# Patient Record
Sex: MALE | Race: WHITE | Employment: OTHER | URBAN - METROPOLITAN AREA
[De-identification: names, ages, dates, MRNs, and addresses within clinical notes are randomized per-mention and may not be internally consistent; named-entity substitution may affect disease eponyms.]

---

## 2017-01-01 ENCOUNTER — OFFICE VISIT (OUTPATIENT)
Dept: ONCOLOGY | Age: 77
End: 2017-01-01

## 2017-01-01 ENCOUNTER — OFFICE VISIT (OUTPATIENT)
Dept: FAMILY MEDICINE CLINIC | Age: 77
End: 2017-01-01

## 2017-01-01 ENCOUNTER — TELEPHONE (OUTPATIENT)
Dept: FAMILY MEDICINE CLINIC | Age: 77
End: 2017-01-01

## 2017-01-01 ENCOUNTER — TELEPHONE (OUTPATIENT)
Dept: ONCOLOGY | Age: 77
End: 2017-01-01

## 2017-01-01 ENCOUNTER — DOCUMENTATION ONLY (OUTPATIENT)
Dept: ONCOLOGY | Age: 77
End: 2017-01-01

## 2017-01-01 ENCOUNTER — HOSPITAL ENCOUNTER (OUTPATIENT)
Dept: CT IMAGING | Age: 77
Discharge: HOME | End: 2017-11-03
Attending: INTERNAL MEDICINE
Payer: MEDICARE

## 2017-01-01 ENCOUNTER — HOSPITAL ENCOUNTER (OUTPATIENT)
Dept: NON INVASIVE DIAGNOSTICS | Age: 77
Discharge: HOME | End: 2017-11-03
Payer: MEDICARE

## 2017-01-01 ENCOUNTER — NURSE NAVIGATOR (OUTPATIENT)
Dept: ONCOLOGY | Age: 77
End: 2017-01-01

## 2017-01-01 ENCOUNTER — HOSPITAL ENCOUNTER (OUTPATIENT)
Dept: INFUSION THERAPY | Age: 77
Discharge: HOME | End: 2017-11-03
Payer: MEDICARE

## 2017-01-01 ENCOUNTER — HOSPITAL ENCOUNTER (OUTPATIENT)
Dept: CT IMAGING | Age: 77
Discharge: HOME | End: 2017-09-26
Attending: INTERNAL MEDICINE
Payer: MEDICARE

## 2017-01-01 ENCOUNTER — HOSPITAL ENCOUNTER (OUTPATIENT)
Dept: PET IMAGING | Age: 77
Discharge: HOME | End: 2017-09-15
Attending: INTERNAL MEDICINE
Payer: MEDICARE

## 2017-01-01 ENCOUNTER — APPOINTMENT (OUTPATIENT)
Dept: PET IMAGING | Age: 77
End: 2017-01-01
Attending: INTERNAL MEDICINE
Payer: MEDICARE

## 2017-01-01 ENCOUNTER — HOSPITAL ENCOUNTER (OUTPATIENT)
Dept: NON INVASIVE DIAGNOSTICS | Age: 77
Discharge: HOME | End: 2017-09-26
Attending: INTERNAL MEDICINE
Payer: MEDICARE

## 2017-01-01 ENCOUNTER — HOSPITAL ENCOUNTER (OUTPATIENT)
Dept: INFUSION THERAPY | Age: 77
Discharge: HOME | End: 2017-11-30
Payer: MEDICARE

## 2017-01-01 ENCOUNTER — HOSPITAL ENCOUNTER (OUTPATIENT)
Dept: MRI IMAGING | Age: 77
Discharge: HOME | End: 2017-09-15
Attending: INTERNAL MEDICINE
Payer: MEDICARE

## 2017-01-01 VITALS
WEIGHT: 122.9 LBS | SYSTOLIC BLOOD PRESSURE: 153 MMHG | HEIGHT: 64 IN | RESPIRATION RATE: 20 BRPM | DIASTOLIC BLOOD PRESSURE: 87 MMHG | HEART RATE: 85 BPM | OXYGEN SATURATION: 98 % | BODY MASS INDEX: 20.98 KG/M2 | TEMPERATURE: 97.1 F

## 2017-01-01 VITALS
SYSTOLIC BLOOD PRESSURE: 108 MMHG | BODY MASS INDEX: 21.15 KG/M2 | WEIGHT: 123.2 LBS | OXYGEN SATURATION: 93 % | RESPIRATION RATE: 20 BRPM | DIASTOLIC BLOOD PRESSURE: 64 MMHG | HEART RATE: 92 BPM

## 2017-01-01 VITALS
OXYGEN SATURATION: 99 % | DIASTOLIC BLOOD PRESSURE: 76 MMHG | WEIGHT: 126 LBS | RESPIRATION RATE: 16 BRPM | SYSTOLIC BLOOD PRESSURE: 150 MMHG | HEART RATE: 80 BPM | BODY MASS INDEX: 21.63 KG/M2

## 2017-01-01 VITALS
BODY MASS INDEX: 20.79 KG/M2 | RESPIRATION RATE: 17 BRPM | TEMPERATURE: 96.2 F | SYSTOLIC BLOOD PRESSURE: 122 MMHG | HEART RATE: 80 BPM | DIASTOLIC BLOOD PRESSURE: 71 MMHG | OXYGEN SATURATION: 96 % | HEIGHT: 64 IN | WEIGHT: 121.8 LBS

## 2017-01-01 VITALS
HEART RATE: 85 BPM | OXYGEN SATURATION: 94 % | BODY MASS INDEX: 20.93 KG/M2 | DIASTOLIC BLOOD PRESSURE: 73 MMHG | RESPIRATION RATE: 17 BRPM | HEIGHT: 64 IN | WEIGHT: 122.6 LBS | SYSTOLIC BLOOD PRESSURE: 130 MMHG | TEMPERATURE: 98 F

## 2017-01-01 VITALS
SYSTOLIC BLOOD PRESSURE: 130 MMHG | TEMPERATURE: 96.4 F | OXYGEN SATURATION: 97 % | RESPIRATION RATE: 16 BRPM | BODY MASS INDEX: 20.94 KG/M2 | DIASTOLIC BLOOD PRESSURE: 80 MMHG | HEART RATE: 88 BPM | WEIGHT: 122 LBS

## 2017-01-01 VITALS
HEART RATE: 86 BPM | BODY MASS INDEX: 20.73 KG/M2 | TEMPERATURE: 99 F | DIASTOLIC BLOOD PRESSURE: 81 MMHG | HEIGHT: 64 IN | SYSTOLIC BLOOD PRESSURE: 123 MMHG | RESPIRATION RATE: 20 BRPM | WEIGHT: 121.4 LBS | OXYGEN SATURATION: 97 %

## 2017-01-01 VITALS
TEMPERATURE: 98.1 F | SYSTOLIC BLOOD PRESSURE: 153 MMHG | DIASTOLIC BLOOD PRESSURE: 93 MMHG | HEART RATE: 88 BPM | RESPIRATION RATE: 16 BRPM

## 2017-01-01 VITALS
WEIGHT: 121.6 LBS | TEMPERATURE: 98.7 F | SYSTOLIC BLOOD PRESSURE: 133 MMHG | HEIGHT: 64 IN | RESPIRATION RATE: 20 BRPM | OXYGEN SATURATION: 96 % | BODY MASS INDEX: 20.76 KG/M2 | DIASTOLIC BLOOD PRESSURE: 85 MMHG | HEART RATE: 86 BPM

## 2017-01-01 VITALS
SYSTOLIC BLOOD PRESSURE: 130 MMHG | RESPIRATION RATE: 14 BRPM | BODY MASS INDEX: 19.97 KG/M2 | HEIGHT: 64 IN | HEART RATE: 74 BPM | WEIGHT: 117 LBS | DIASTOLIC BLOOD PRESSURE: 80 MMHG

## 2017-01-01 VITALS
HEIGHT: 64 IN | SYSTOLIC BLOOD PRESSURE: 164 MMHG | DIASTOLIC BLOOD PRESSURE: 100 MMHG | TEMPERATURE: 97.5 F | HEART RATE: 90 BPM | OXYGEN SATURATION: 95 % | BODY MASS INDEX: 21.17 KG/M2 | WEIGHT: 124 LBS

## 2017-01-01 VITALS
SYSTOLIC BLOOD PRESSURE: 135 MMHG | TEMPERATURE: 97.5 F | RESPIRATION RATE: 16 BRPM | HEART RATE: 89 BPM | DIASTOLIC BLOOD PRESSURE: 87 MMHG

## 2017-01-01 VITALS — BODY MASS INDEX: 21.34 KG/M2 | WEIGHT: 125 LBS | HEIGHT: 64 IN

## 2017-01-01 DIAGNOSIS — R07.81 PLEURITIC CHEST PAIN: ICD-10-CM

## 2017-01-01 DIAGNOSIS — C64.9 PRIMARY MALIGNANT NEOPLASM OF KIDNEY WITH METASTASIS FROM KIDNEY TO OTHER SITE, UNSPECIFIED LATERALITY (HCC): Primary | ICD-10-CM

## 2017-01-01 DIAGNOSIS — Z86.711 HISTORY OF PULMONARY EMBOLUS (PE): ICD-10-CM

## 2017-01-01 DIAGNOSIS — Z79.01 ANTICOAGULANT LONG-TERM USE: ICD-10-CM

## 2017-01-01 DIAGNOSIS — I26.99 BILATERAL PULMONARY EMBOLISM (HCC): Primary | ICD-10-CM

## 2017-01-01 DIAGNOSIS — C79.9: ICD-10-CM

## 2017-01-01 DIAGNOSIS — C64.1 RENAL CARCINOMA, RIGHT (HCC): ICD-10-CM

## 2017-01-01 DIAGNOSIS — Z23 ENCOUNTER FOR IMMUNIZATION: ICD-10-CM

## 2017-01-01 DIAGNOSIS — Z79.01 ANTICOAGULANT LONG-TERM USE: Primary | ICD-10-CM

## 2017-01-01 DIAGNOSIS — R06.02 SOB (SHORTNESS OF BREATH) ON EXERTION: ICD-10-CM

## 2017-01-01 DIAGNOSIS — R06.09 DOE (DYSPNEA ON EXERTION): ICD-10-CM

## 2017-01-01 DIAGNOSIS — C64.9 RENAL CARCINOMA, UNSPECIFIED LATERALITY (HCC): ICD-10-CM

## 2017-01-01 DIAGNOSIS — I26.99 BILATERAL PULMONARY EMBOLISM (HCC): ICD-10-CM

## 2017-01-01 DIAGNOSIS — C64.9 KIDNEY CANCER, PRIMARY, WITH METASTASIS FROM KIDNEY TO OTHER SITE, UNSPECIFIED LATERALITY (HCC): Primary | ICD-10-CM

## 2017-01-01 DIAGNOSIS — I10 ESSENTIAL HYPERTENSION: ICD-10-CM

## 2017-01-01 DIAGNOSIS — C64.1 PRIMARY CLEAR CELL CARCINOMA OF RIGHT KIDNEY (HCC): ICD-10-CM

## 2017-01-01 DIAGNOSIS — C64.9 KIDNEY CANCER, PRIMARY, WITH METASTASIS FROM KIDNEY TO OTHER SITE, UNSPECIFIED LATERALITY (HCC): ICD-10-CM

## 2017-01-01 DIAGNOSIS — C78.00 MALIGNANT NEOPLASM METASTATIC TO LUNG, UNSPECIFIED LATERALITY (HCC): ICD-10-CM

## 2017-01-01 DIAGNOSIS — D41.01 NEOPLASM OF UNCERTAIN BEHAVIOR OF RIGHT KIDNEY: ICD-10-CM

## 2017-01-01 DIAGNOSIS — C64.1 PRIMARY CLEAR CELL CARCINOMA OF RIGHT KIDNEY (HCC): Primary | ICD-10-CM

## 2017-01-01 DIAGNOSIS — C64.9 RENAL CARCINOMA, UNSPECIFIED LATERALITY (HCC): Primary | ICD-10-CM

## 2017-01-01 DIAGNOSIS — Z00.00 MEDICARE ANNUAL WELLNESS VISIT, SUBSEQUENT: Primary | ICD-10-CM

## 2017-01-01 DIAGNOSIS — R79.89 ABNORMAL LFTS: Primary | ICD-10-CM

## 2017-01-01 DIAGNOSIS — C64.9 CARCINOMA OF KIDNEY, UNSPECIFIED LATERALITY (HCC): ICD-10-CM

## 2017-01-01 LAB
ALBUMIN SERPL-MCNC: 2.6 G/DL (ref 3.5–5)
ALBUMIN SERPL-MCNC: 2.8 G/DL (ref 3.5–5)
ALBUMIN SERPL-MCNC: 3.8 G/DL (ref 3.5–4.8)
ALBUMIN/GLOB SERPL: 0.5 {RATIO} (ref 1.1–2.2)
ALBUMIN/GLOB SERPL: 0.6 {RATIO} (ref 1.1–2.2)
ALBUMIN/GLOB SERPL: 1.2 {RATIO} (ref 1.2–2.2)
ALP SERPL-CCNC: 105 IU/L (ref 39–117)
ALP SERPL-CCNC: 151 U/L (ref 45–117)
ALP SERPL-CCNC: 170 U/L (ref 45–117)
ALT SERPL-CCNC: 121 U/L (ref 12–78)
ALT SERPL-CCNC: 18 IU/L (ref 0–44)
ALT SERPL-CCNC: 35 U/L (ref 12–78)
ANION GAP SERPL CALC-SCNC: 7 MMOL/L (ref 5–15)
ANION GAP SERPL CALC-SCNC: 9 MMOL/L (ref 5–15)
AST SERPL-CCNC: 16 IU/L (ref 0–40)
AST SERPL-CCNC: 39 U/L (ref 15–37)
AST SERPL-CCNC: 89 U/L (ref 15–37)
ATRIAL RATE: 74 BPM
BASOPHILS # BLD AUTO: 0 X10E3/UL (ref 0–0.2)
BASOPHILS # BLD: 0 K/UL (ref 0–0.1)
BASOPHILS # BLD: 0 K/UL (ref 0–0.1)
BASOPHILS NFR BLD AUTO: 0 %
BASOPHILS NFR BLD: 0 % (ref 0–1)
BASOPHILS NFR BLD: 0 % (ref 0–1)
BILIRUB SERPL-MCNC: 0.2 MG/DL (ref 0–1.2)
BILIRUB SERPL-MCNC: 0.5 MG/DL (ref 0.2–1)
BILIRUB SERPL-MCNC: 0.5 MG/DL (ref 0.2–1)
BUN SERPL-MCNC: 22 MG/DL (ref 6–20)
BUN SERPL-MCNC: 27 MG/DL (ref 6–20)
BUN SERPL-MCNC: 30 MG/DL (ref 8–27)
BUN/CREAT SERPL: 16 (ref 12–20)
BUN/CREAT SERPL: 18 (ref 12–20)
BUN/CREAT SERPL: 22 (ref 10–24)
CALCIUM SERPL-MCNC: 8.9 MG/DL (ref 8.5–10.1)
CALCIUM SERPL-MCNC: 9.1 MG/DL (ref 8.6–10.2)
CALCIUM SERPL-MCNC: 9.5 MG/DL (ref 8.5–10.1)
CALCULATED P AXIS, ECG09: 79 DEGREES
CALCULATED R AXIS, ECG10: 105 DEGREES
CALCULATED T AXIS, ECG11: 58 DEGREES
CHLORIDE SERPL-SCNC: 100 MMOL/L (ref 97–108)
CHLORIDE SERPL-SCNC: 102 MMOL/L (ref 96–106)
CHLORIDE SERPL-SCNC: 102 MMOL/L (ref 97–108)
CO2 SERPL-SCNC: 24 MMOL/L (ref 18–29)
CO2 SERPL-SCNC: 24 MMOL/L (ref 21–32)
CO2 SERPL-SCNC: 28 MMOL/L (ref 21–32)
CREAT SERPL-MCNC: 1.34 MG/DL (ref 0.7–1.3)
CREAT SERPL-MCNC: 1.37 MG/DL (ref 0.76–1.27)
CREAT SERPL-MCNC: 1.46 MG/DL (ref 0.7–1.3)
CREAT UR-MCNC: 140 MG/DL
DIAGNOSIS, 93000: NORMAL
EOSINOPHIL # BLD AUTO: 0.2 X10E3/UL (ref 0–0.4)
EOSINOPHIL # BLD: 0.2 K/UL (ref 0–0.4)
EOSINOPHIL # BLD: 0.3 K/UL (ref 0–0.4)
EOSINOPHIL NFR BLD AUTO: 2 %
EOSINOPHIL NFR BLD: 2 % (ref 0–7)
EOSINOPHIL NFR BLD: 3 % (ref 0–7)
ERYTHROCYTE [DISTWIDTH] IN BLOOD BY AUTOMATED COUNT: 14.3 % (ref 12.3–15.4)
ERYTHROCYTE [DISTWIDTH] IN BLOOD BY AUTOMATED COUNT: 15.2 % (ref 11.5–14.5)
ERYTHROCYTE [DISTWIDTH] IN BLOOD BY AUTOMATED COUNT: 19 % (ref 11.5–14.5)
GLOBULIN SER CALC-MCNC: 3.1 G/DL (ref 1.5–4.5)
GLOBULIN SER CALC-MCNC: 5 G/DL (ref 2–4)
GLOBULIN SER CALC-MCNC: 5.1 G/DL (ref 2–4)
GLUCOSE SERPL-MCNC: 111 MG/DL (ref 65–100)
GLUCOSE SERPL-MCNC: 113 MG/DL (ref 65–100)
GLUCOSE SERPL-MCNC: 79 MG/DL (ref 65–99)
HCT VFR BLD AUTO: 35.2 % (ref 37.5–51)
HCT VFR BLD AUTO: 39.9 % (ref 36.6–50.3)
HCT VFR BLD AUTO: 43.2 % (ref 36.6–50.3)
HGB BLD-MCNC: 11.5 G/DL (ref 12.6–17.7)
HGB BLD-MCNC: 13.1 G/DL (ref 12.1–17)
HGB BLD-MCNC: 13.9 G/DL (ref 12.1–17)
IMM GRANULOCYTES # BLD: 0 X10E3/UL (ref 0–0.1)
IMM GRANULOCYTES NFR BLD: 0 %
LYMPHOCYTES # BLD AUTO: 1.8 X10E3/UL (ref 0.7–3.1)
LYMPHOCYTES # BLD: 1.8 K/UL (ref 0.8–3.5)
LYMPHOCYTES # BLD: 1.8 K/UL (ref 0.8–3.5)
LYMPHOCYTES NFR BLD AUTO: 18 %
LYMPHOCYTES NFR BLD: 17 % (ref 12–49)
LYMPHOCYTES NFR BLD: 19 % (ref 12–49)
MAGNESIUM SERPL-MCNC: 2.3 MG/DL (ref 1.6–2.4)
MCH RBC QN AUTO: 27.6 PG (ref 26–34)
MCH RBC QN AUTO: 28.4 PG (ref 26.6–33)
MCH RBC QN AUTO: 28.9 PG (ref 26–34)
MCHC RBC AUTO-ENTMCNC: 32.2 G/DL (ref 30–36.5)
MCHC RBC AUTO-ENTMCNC: 32.7 G/DL (ref 31.5–35.7)
MCHC RBC AUTO-ENTMCNC: 32.8 G/DL (ref 30–36.5)
MCV RBC AUTO: 85.7 FL (ref 80–99)
MCV RBC AUTO: 87 FL (ref 79–97)
MCV RBC AUTO: 87.9 FL (ref 80–99)
MONOCYTES # BLD AUTO: 0.9 X10E3/UL (ref 0.1–0.9)
MONOCYTES # BLD: 0.8 K/UL (ref 0–1)
MONOCYTES # BLD: 1.1 K/UL (ref 0–1)
MONOCYTES NFR BLD AUTO: 9 %
MONOCYTES NFR BLD: 10 % (ref 5–13)
MONOCYTES NFR BLD: 9 % (ref 5–13)
NEUTROPHILS # BLD AUTO: 6.9 X10E3/UL (ref 1.4–7)
NEUTROPHILS NFR BLD AUTO: 71 %
NEUTS SEG # BLD: 6.4 K/UL (ref 1.8–8)
NEUTS SEG # BLD: 7.5 K/UL (ref 1.8–8)
NEUTS SEG NFR BLD: 69 % (ref 32–75)
NEUTS SEG NFR BLD: 71 % (ref 32–75)
P-R INTERVAL, ECG05: 140 MS
PLATELET # BLD AUTO: 207 K/UL (ref 150–400)
PLATELET # BLD AUTO: 208 K/UL (ref 150–400)
PLATELET # BLD AUTO: 395 X10E3/UL (ref 150–379)
POTASSIUM SERPL-SCNC: 4.2 MMOL/L (ref 3.5–5.1)
POTASSIUM SERPL-SCNC: 4.8 MMOL/L (ref 3.5–5.1)
POTASSIUM SERPL-SCNC: 5 MMOL/L (ref 3.5–5.2)
PROT SERPL-MCNC: 6.9 G/DL (ref 6–8.5)
PROT SERPL-MCNC: 7.7 G/DL (ref 6.4–8.2)
PROT SERPL-MCNC: 7.8 G/DL (ref 6.4–8.2)
PROT UR-MCNC: 142 MG/DL (ref 0–11.9)
Q-T INTERVAL, ECG07: 402 MS
QRS DURATION, ECG06: 88 MS
QTC CALCULATION (BEZET), ECG08: 446 MS
RBC # BLD AUTO: 4.05 X10E6/UL (ref 4.14–5.8)
RBC # BLD AUTO: 4.54 M/UL (ref 4.1–5.7)
RBC # BLD AUTO: 5.04 M/UL (ref 4.1–5.7)
SODIUM SERPL-SCNC: 135 MMOL/L (ref 136–145)
SODIUM SERPL-SCNC: 135 MMOL/L (ref 136–145)
SODIUM SERPL-SCNC: 142 MMOL/L (ref 134–144)
TSH SERPL DL<=0.05 MIU/L-ACNC: 4.44 UIU/ML (ref 0.36–3.74)
VENTRICULAR RATE, ECG03: 74 BPM
WBC # BLD AUTO: 10.6 K/UL (ref 4.1–11.1)
WBC # BLD AUTO: 9.3 K/UL (ref 4.1–11.1)
WBC # BLD AUTO: 9.9 X10E3/UL (ref 3.4–10.8)

## 2017-01-01 PROCEDURE — 84156 ASSAY OF PROTEIN URINE: CPT | Performed by: NURSE PRACTITIONER

## 2017-01-01 PROCEDURE — 85025 COMPLETE CBC W/AUTO DIFF WBC: CPT | Performed by: NURSE PRACTITIONER

## 2017-01-01 PROCEDURE — 82570 ASSAY OF URINE CREATININE: CPT | Performed by: NURSE PRACTITIONER

## 2017-01-01 PROCEDURE — 80053 COMPREHEN METABOLIC PANEL: CPT | Performed by: NURSE PRACTITIONER

## 2017-01-01 PROCEDURE — 36415 COLL VENOUS BLD VENIPUNCTURE: CPT | Performed by: NURSE PRACTITIONER

## 2017-01-01 PROCEDURE — 93306 TTE W/DOPPLER COMPLETE: CPT

## 2017-01-01 PROCEDURE — 74011636320 HC RX REV CODE- 636/320

## 2017-01-01 PROCEDURE — 84443 ASSAY THYROID STIM HORMONE: CPT | Performed by: NURSE PRACTITIONER

## 2017-01-01 PROCEDURE — 74177 CT ABD & PELVIS W/CONTRAST: CPT

## 2017-01-01 PROCEDURE — A9552 F18 FDG: HCPCS

## 2017-01-01 PROCEDURE — 70551 MRI BRAIN STEM W/O DYE: CPT

## 2017-01-01 PROCEDURE — 74011000255 HC RX REV CODE- 255: Performed by: INTERNAL MEDICINE

## 2017-01-01 PROCEDURE — 74011636320 HC RX REV CODE- 636/320: Performed by: INTERNAL MEDICINE

## 2017-01-01 PROCEDURE — 83735 ASSAY OF MAGNESIUM: CPT | Performed by: NURSE PRACTITIONER

## 2017-01-01 PROCEDURE — 74011250636 HC RX REV CODE- 250/636: Performed by: INTERNAL MEDICINE

## 2017-01-01 PROCEDURE — 71275 CT ANGIOGRAPHY CHEST: CPT

## 2017-01-01 RX ORDER — HYDROCODONE BITARTRATE AND ACETAMINOPHEN 7.5; 3 MG/1; MG/1
1 TABLET ORAL
Qty: 60 TAB | Refills: 0 | Status: SHIPPED | OUTPATIENT
Start: 2017-01-01 | End: 2017-01-01 | Stop reason: ALTCHOICE

## 2017-01-01 RX ORDER — SODIUM CHLORIDE 0.9 % (FLUSH) 0.9 %
10 SYRINGE (ML) INJECTION
Status: COMPLETED | OUTPATIENT
Start: 2017-01-01 | End: 2017-01-01

## 2017-01-01 RX ORDER — SODIUM CHLORIDE 0.9 % (FLUSH) 0.9 %
10 SYRINGE (ML) INJECTION
Status: DISPENSED | OUTPATIENT
Start: 2017-01-01 | End: 2017-01-01

## 2017-01-01 RX ORDER — HYDROCODONE BITARTRATE AND ACETAMINOPHEN 5; 325 MG/1; MG/1
1 TABLET ORAL
Qty: 60 TAB | Refills: 0 | Status: SHIPPED | OUTPATIENT
Start: 2017-01-01 | End: 2017-01-01 | Stop reason: ALTCHOICE

## 2017-01-01 RX ORDER — SUNITINIB MALATE 50 MG/1
CAPSULE ORAL
COMMUNITY
Start: 2017-01-01 | End: 2017-01-01 | Stop reason: ALTCHOICE

## 2017-01-01 RX ORDER — APIXABAN 5 MG/1
TABLET, FILM COATED ORAL
COMMUNITY
Start: 2017-01-01 | End: 2017-01-01 | Stop reason: ALTCHOICE

## 2017-01-01 RX ORDER — AMLODIPINE BESYLATE 5 MG/1
10 TABLET ORAL DAILY
Qty: 60 TAB | Refills: 5 | Status: ON HOLD | OUTPATIENT
Start: 2017-01-01 | End: 2018-01-01 | Stop reason: SDUPTHER

## 2017-01-01 RX ORDER — AMLODIPINE BESYLATE 5 MG/1
5 TABLET ORAL DAILY
Qty: 90 TAB | Refills: 4 | Status: SHIPPED | OUTPATIENT
Start: 2017-01-01 | End: 2017-01-01 | Stop reason: SDUPTHER

## 2017-01-01 RX ORDER — BARIUM SULFATE 20 MG/ML
900 SUSPENSION ORAL
Status: COMPLETED | OUTPATIENT
Start: 2017-01-01 | End: 2017-01-01

## 2017-01-01 RX ORDER — SODIUM CHLORIDE 9 MG/ML
50 INJECTION, SOLUTION INTRAVENOUS
Status: COMPLETED | OUTPATIENT
Start: 2017-01-01 | End: 2017-01-01

## 2017-01-01 RX ORDER — ENOXAPARIN SODIUM 100 MG/ML
80 INJECTION SUBCUTANEOUS DAILY
Qty: 30 SYRINGE | Refills: 11 | Status: SHIPPED | OUTPATIENT
Start: 2017-01-01 | End: 2017-01-01 | Stop reason: ALTCHOICE

## 2017-01-01 RX ADMIN — Medication 10 ML: at 12:47

## 2017-01-01 RX ADMIN — Medication 10 ML: at 08:11

## 2017-01-01 RX ADMIN — IOPAMIDOL 100 ML: 755 INJECTION, SOLUTION INTRAVENOUS at 12:47

## 2017-01-01 RX ADMIN — SODIUM CHLORIDE 50 ML/HR: 900 INJECTION, SOLUTION INTRAVENOUS at 12:47

## 2017-01-01 RX ADMIN — BARIUM SULFATE 900 ML: 21 SUSPENSION ORAL at 12:47

## 2017-03-22 ENCOUNTER — OFFICE VISIT (OUTPATIENT)
Dept: FAMILY MEDICINE CLINIC | Age: 77
End: 2017-03-22

## 2017-03-22 VITALS
BODY MASS INDEX: 22.2 KG/M2 | HEART RATE: 78 BPM | RESPIRATION RATE: 19 BRPM | WEIGHT: 130 LBS | SYSTOLIC BLOOD PRESSURE: 128 MMHG | DIASTOLIC BLOOD PRESSURE: 76 MMHG | OXYGEN SATURATION: 98 % | HEIGHT: 64 IN

## 2017-03-22 DIAGNOSIS — R06.02 SOB (SHORTNESS OF BREATH): Primary | ICD-10-CM

## 2017-03-22 DIAGNOSIS — C64.1 RENAL CELL CANCER, RIGHT (HCC): ICD-10-CM

## 2017-03-22 DIAGNOSIS — I27.82 OTHER CHRONIC PULMONARY EMBOLISM WITHOUT ACUTE COR PULMONALE (HCC): ICD-10-CM

## 2017-03-22 RX ORDER — ASPIRIN 81 MG/1
TABLET ORAL DAILY
COMMUNITY
End: 2017-01-01

## 2017-03-22 RX ORDER — IPRATROPIUM BROMIDE 21 UG/1
2 SPRAY, METERED NASAL 2 TIMES DAILY
Qty: 30 ML | Refills: 1 | Status: SHIPPED | OUTPATIENT
Start: 2017-03-22 | End: 2017-04-20

## 2017-03-22 NOTE — PROGRESS NOTES
Chief Complaint   Patient presents with    Shortness of Breath     coughing in the mornings. HPI:      Chris Logan is a 68 y.o. male. 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 He experienced post op Pulmonary Embolism and remains on Warfarin until Sept 2016. He is otherwise very healthy and active and enjoys fishing. He has hypercholesterolemia but does not require medication at this time. New Issues:  Has 2 complaints:  Early morning nasal drainage with cough that gets better as the day progresses. Notes BEJARANO and SOB, particularly when ascending stairs. No hemoptysis. No sputum. Brother recently noted to have facial melanoma. No Known Allergies    Current Outpatient Prescriptions   Medication Sig    ANTIOX#10/OM3/DHA/EPA/LUT/ZEAX (I-CAPS PO) Take  by mouth.  aspirin delayed-release 81 mg tablet Take  by mouth daily.  ipratropium (ATROVENT) 0.03 % nasal spray 2 Sprays by Both Nostrils route two (2) times a day. No current facility-administered medications for this visit. Past Medical History:   Diagnosis Date    Actinic keratosis     Calculus of kidney     Chronic pain     Affecting lower back,Grade 3L5/S1 spondylisthesis    Hypercholesterolemia     Palpitations     PE (pulmonary embolism) 09/01/2014    Post right nephrectomy    Renal carcinoma ([facility] [address]     Dx'd inSept.2014, S/P surgical resection         ROS:  Denies fever, chills, cough, chest pain, SOB,  nausea, vomiting, or diarrhea. Denies wt loss, wt gain, hemoptysis, hematochezia or melena.     Physical Examination:    /76 (BP 1 Location: Right arm, BP Patient Position: Sitting)  Pulse 78  Resp 19  Ht 5' 3.5\" (1.613 m)  Wt 130 lb (59 kg)  SpO2 98%  BMI 22.67 kg/m2    General: Alert and Ox3, Fluent speech  HEENT:  NC/AT, EOMI, OP: clear  Neck:  Supple, no adenopathy, JVD, mass or bruit  Chest:  Clear to Ausculation, without wheezes, rales, rubs or ronchi  Cardiac: RRR  Abdomen:  +BS, soft, nontender without palpable HSM  Extremities:  No cyanosis, clubbing or edema  Neurologic:  Ambulatory without assist, CN 2-12 grossly intact. Moves all extremities. Skin: no rash  Lymphadenopathy: no cervical or supraclavicular nodes      ASSESSMENT AND PLAN:     1. BEJARANO and SOB:  Etiology is unclear. Checking CXR, D DImer, BNP, CBC and CMP. Certainly has an element of cough due to PND. If D dimer is +, I would favor further imaging and reinstitution of anticoagulation  2. PND:  Trial of Atrovent. 3.  WIll phone with results. Orders Placed This Encounter    XR CHEST PA LAT     Standing Status:   Future     Standing Expiration Date:   2018     Order Specific Question:   Reason for Exam     Answer:   BEJARANO, SOB     Order Specific Question:   Is Patient Allergic to Contrast Dye? Answer:   Unknown    D DIMER    CBC WITH AUTOMATED DIFF    METABOLIC PANEL, COMPREHENSIVE    BNP    IL COLLECTION VENOUS BLOOD,VENIPUNCTURE    ANTIOX#10/OM3/DHA/EPA/LUT/ZEAX (I-CAPS PO)     Sig: Take  by mouth.  aspirin delayed-release 81 mg tablet     Sig: Take  by mouth daily.  ipratropium (ATROVENT) 0.03 % nasal spray     Si Sprays by Both Nostrils route two (2) times a day.      Dispense:  30 mL     Refill:  1       Harley Wood MD, Bam Castro

## 2017-03-22 NOTE — MR AVS SNAPSHOT
Visit Information Date & Time Provider Department Dept. Phone Encounter #  
 3/22/2017  2:00 PM Nixon Ray, Cindy Harvey [id number] Follow-up Instructions Return in about 4 weeks (around 4/19/2017). Follow-up and Disposition History Upcoming Health Maintenance Date Due  
 GLAUCOMA SCREENING Q2Y 2/7/2005 Pneumococcal 65+ High/Highest Risk (1 of 2 - PCV13) 2/7/2005 MEDICARE YEARLY EXAM 2/7/2005 DTaP/Tdap/Td series (2 - Td) 10/7/2025 Allergies as of 3/22/2017  Review Complete On: 3/22/2017 By: Nixon Ray MD  
 No Known Allergies Current Immunizations  Never Reviewed Name Date Influenza Vaccine 11/4/2016, 10/7/2015 12:17 PM  
 Tdap 10/7/2015 12:19 PM  
 Zoster Vaccine, Live 4/4/2011 Not reviewed this visit You Were Diagnosed With   
  
 Codes Comments SOB (shortness of breath)    -  Primary ICD-10-CM: R06.02 
ICD-9-CM: 786.05 Renal cell cancer, right (HCC)     ICD-10-CM: C64.1 ICD-9-CM: 189.0 Other chronic pulmonary embolism without acute cor pulmonale (HCC)     ICD-10-CM: G97.21 Vitals BP Pulse Resp Height(growth percentile) Weight(growth percentile) SpO2  
 128/76 (BP 1 Location: Right arm, BP Patient Position: Sitting) 78 19 5' 3.5\" (1.613 m) 130 lb (59 kg) 98% BMI Smoking Status 22.67 kg/m2 Light Tobacco Smoker Vitals History BMI and BSA Data Body Mass Index Body Surface Area  
 22.67 kg/m 2 1.63 m 2 Preferred Pharmacy Pharmacy Name Phone [address] [place] [phone] Your Updated Medication List  
  
   
This list is accurate as of: 3/22/17  3:09 PM.  Always use your most recent med list.  
  
  
  
  
 aspirin delayed-release 81 mg tablet Take  by mouth daily. I-CAPS PO Take  by mouth. ipratropium 0.03 % nasal spray Commonly known as:  ATROVENT  
 2 Sprays by Both Nostrils route two (2) times a day. Prescriptions Printed Refills  
 ipratropium (ATROVENT) 0.03 % nasal spray 1 Si Sprays by Both Nostrils route two (2) times a day. Class: Print Route: Both Nostrils We Performed the Following BNP [id number] CPT(R)] CBC WITH AUTOMATED DIFF [07088 CPT(R)] D DIMER [id number] CPT(R)] METABOLIC PANEL, COMPREHENSIVE [50024 CPT(R)] HI COLLECTION VENOUS BLOOD,VENIPUNCTURE [id number] CPT(R)] Follow-up Instructions Return in about 4 weeks (around 2017). To-Do List   
 Around 2017 Imaging:  XR CHEST PA LAT Introducing [facility] SERVICES! Dear Sruthi Eduardo: Thank you for requesting a [email or web address] account. Our records indicate that you already have an active [email or web address] account. You can access your account anytime at [email or web address] Did you know that you can access your hospital and ER discharge instructions at any time in [email or web address]? You can also review all of your test results from your hospital stay or ER visit. Additional Information If you have questions, please visit the Frequently Asked Questions section of the [email or web address] website at [email or web address] Remember, [email or web address] is NOT to be used for urgent needs. For medical emergencies, dial 911. Now available from your iPhone and Android! Please provide this summary of care documentation to your next provider. Your primary care clinician is listed as Kaity Jacobs. If you have any questions after today's visit, please call [phone].

## 2017-03-23 LAB
ALBUMIN SERPL-MCNC: 4 G/DL (ref 3.5–4.8)
ALBUMIN/GLOB SERPL: 1.3 {RATIO} (ref 1.2–2.2)
ALP SERPL-CCNC: 85 IU/L (ref 39–117)
ALT SERPL-CCNC: 16 IU/L (ref 0–44)
AST SERPL-CCNC: 19 IU/L (ref 0–40)
BASOPHILS # BLD AUTO: 0 X10E3/UL (ref 0–0.2)
BASOPHILS NFR BLD AUTO: 0 %
BILIRUB SERPL-MCNC: <0.2 MG/DL (ref 0–1.2)
BNP SERPL-MCNC: 16.6 PG/ML (ref 0–100)
BUN SERPL-MCNC: 35 MG/DL (ref 8–27)
BUN/CREAT SERPL: 23 (ref 10–22)
CALCIUM SERPL-MCNC: 9.5 MG/DL (ref 8.6–10.2)
CHLORIDE SERPL-SCNC: 99 MMOL/L (ref 96–106)
CO2 SERPL-SCNC: 25 MMOL/L (ref 18–29)
CREAT SERPL-MCNC: 1.51 MG/DL (ref 0.76–1.27)
D DIMER PPP FEU-MCNC: 0.47 MG/L FEU (ref 0–0.49)
EOSINOPHIL # BLD AUTO: 0.4 X10E3/UL (ref 0–0.4)
EOSINOPHIL NFR BLD AUTO: 3 %
ERYTHROCYTE [DISTWIDTH] IN BLOOD BY AUTOMATED COUNT: 14.2 % (ref 12.3–15.4)
GLOBULIN SER CALC-MCNC: 3 G/DL (ref 1.5–4.5)
GLUCOSE SERPL-MCNC: 94 MG/DL (ref 65–99)
HCT VFR BLD AUTO: 36.8 % (ref 37.5–51)
HGB BLD-MCNC: 12.4 G/DL (ref 12.6–17.7)
IMM GRANULOCYTES # BLD: 0 X10E3/UL (ref 0–0.1)
IMM GRANULOCYTES NFR BLD: 0 %
LYMPHOCYTES # BLD AUTO: 2.7 X10E3/UL (ref 0.7–3.1)
LYMPHOCYTES NFR BLD AUTO: 26 %
MCH RBC QN AUTO: 29.2 PG (ref 26.6–33)
MCHC RBC AUTO-ENTMCNC: 33.7 G/DL (ref 31.5–35.7)
MCV RBC AUTO: 87 FL (ref 79–97)
MONOCYTES # BLD AUTO: 0.8 X10E3/UL (ref 0.1–0.9)
MONOCYTES NFR BLD AUTO: 7 %
NEUTROPHILS # BLD AUTO: 6.7 X10E3/UL (ref 1.4–7)
NEUTROPHILS NFR BLD AUTO: 64 %
PLATELET # BLD AUTO: 343 X10E3/UL (ref 150–379)
POTASSIUM SERPL-SCNC: 5.1 MMOL/L (ref 3.5–5.2)
PROT SERPL-MCNC: 7 G/DL (ref 6–8.5)
RBC # BLD AUTO: 4.25 X10E6/UL (ref 4.14–5.8)
SODIUM SERPL-SCNC: 141 MMOL/L (ref 134–144)
WBC # BLD AUTO: 10.7 X10E3/UL (ref 3.4–10.8)

## 2017-04-20 ENCOUNTER — OFFICE VISIT (OUTPATIENT)
Dept: FAMILY MEDICINE CLINIC | Age: 77
End: 2017-04-20

## 2017-04-20 VITALS
WEIGHT: 128.2 LBS | HEIGHT: 64 IN | SYSTOLIC BLOOD PRESSURE: 126 MMHG | OXYGEN SATURATION: 98 % | TEMPERATURE: 97.8 F | RESPIRATION RATE: 16 BRPM | HEART RATE: 71 BPM | BODY MASS INDEX: 21.89 KG/M2 | DIASTOLIC BLOOD PRESSURE: 76 MMHG

## 2017-04-20 DIAGNOSIS — C44.311 BASAL CELL CARCINOMA OF NOSE: ICD-10-CM

## 2017-04-20 DIAGNOSIS — L57.0 ACTINIC KERATOSIS: ICD-10-CM

## 2017-04-20 DIAGNOSIS — R06.09 DOE (DYSPNEA ON EXERTION): Primary | ICD-10-CM

## 2017-04-20 NOTE — PROGRESS NOTES
Chief Complaint   Patient presents with    Shortness of Breath     With Exercise For Several Months. 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 He experienced post op Pulmonary Embolism and remained  on Warfarin until Sept 2016. He is otherwise very healthy and active and enjoys fishing. He has hypercholesterolemia but does not require medication at this time. Mr Dileep Clarke presented to the clinic at [facility] 3/22/14 with BEJARANO and SOB particularly when ascending stairs. This had been present for several weeks. Due to concerns of recurrent PE and CHF as potential etiologies, he underwent a limited workup at that time which resulted in a BNP 16, D-Dimer 0.47, Hgb 12.4 and a normal CXR. A conditioning program was prescribed with interval reassessment in 3-4 weeks. New Issues:  Mr Dileep Clarke remains SOB and primarily complains of BEJARANO. No pleuritic pain. Also notes that he has several skin problems:  Nose and left arm. No Known Allergies    Current Outpatient Prescriptions   Medication Sig    ANTIOX#10/OM3/DHA/EPA/LUT/ZEAX (I-CAPS PO) Take  by mouth.  aspirin delayed-release 81 mg tablet Take  by mouth daily. No current facility-administered medications for this visit. Past Medical History:   Diagnosis Date    Actinic keratosis     Calculus of kidney     Chronic pain     Affecting lower back,Grade 3L5/S1 spondylisthesis    Hypercholesterolemia     Palpitations     PE (pulmonary embolism) 09/01/2014    Post right nephrectomy    Renal carcinoma ([facility] [address]     Dx'd inSept.2014, S/P surgical resection         ROS:  Denies fever, chills, cough, chest pain, SOB,  nausea, vomiting, or diarrhea. Denies wt loss, wt gain, hemoptysis, hematochezia or melena.     Physical Examination:    /76 (BP 1 Location: Left arm, BP Patient Position: Sitting)  Pulse 71  Temp 97.8 °F (36.6 °C) (Temporal)   Resp 16  Ht 5' 3.5\" (1.613 m)  Wt 128 lb 3.2 oz (58.2 kg)  SpO2 98%  BMI 22.35 kg/m2    General: Alert and Ox3, Fluent speech  HEENT:  NC/AT, EOMI, OP: clear  Neck:  Supple, no adenopathy, JVD, mass or bruit  Chest:  Clear to Ausculation, without wheezes, rales, rubs or ronchi  Cardiac: RRR  Abdomen:  +BS, soft, nontender without palpable HSM  Extremities:  No cyanosis, clubbing or edema  Neurologic:  Ambulatory without assist, CN 2-12 grossly intact. Moves all extremities. Skin:           Lymphadenopathy: no cervical or supraclavicular nodes    Procedure Note:  Cryotherapy for the treatment of a single Actinic Keratosis    The risks, benefits and alternatives to treatment were carefully explained to the patient who voiced understanding and  desires to proceed. With the patient's verbal permission, a single actinic keratosis was treated with 2 applications of Liquid Nitrogen. The patient tolerated the procedure well and there were no complications. The patient was instructed to RTC for follow up if redness, swelling or new lesions emerge. ASSESSMENT AND PLAN:     1.  BEJARANO:  Needs additional workup:  EST Myoview to exclude BEJARANO as anginal equivalent. Echo to rule out valvular heart disease and to assess PA pressures. May need CT chest depending on outcome of imaging/echo  2. AK: left arm:  Treated with LN2 x1  3. Nasal lesion highly suspicious for basal cell ca:  Refer to Dr Reanna Perales for treatment. 4.  RTC in June    No orders of the defined types were placed in this encounter.       Michelle Vincent MD, [place]

## 2017-04-20 NOTE — MR AVS SNAPSHOT
Visit Information Date & Time Provider Department Dept. Phone Encounter #  
 4/20/2017 11:30 AM Xuan Harris [place] [phone] [id number] Your Appointments 6/29/2017  3:00 PM  
ESTABLISHED PATIENT with Xuan Harris MD  
[address] ([place]) Appt Note: 2 mo F/U  
 [address] [address]  
[address] [facility] [address] [address] Upcoming Health Maintenance Date Due  
 GLAUCOMA SCREENING Q2Y 2/7/2005 Pneumococcal 65+ High/Highest Risk (1 of 2 - PCV13) 2/7/2005 MEDICARE YEARLY EXAM 2/7/2005 DTaP/Tdap/Td series (2 - Td) 10/7/2025 Allergies as of 4/20/2017  Review Complete On: 4/20/2017 By: Xuan Harris MD  
 No Known Allergies Current Immunizations  Never Reviewed Name Date Influenza Vaccine 11/4/2016, 10/7/2015 12:17 PM  
 Tdap 10/7/2015 12:19 PM  
 Zoster Vaccine, Live 4/4/2011 Not reviewed this visit Vitals BP Pulse Temp Resp Height(growth percentile) 126/76 (BP 1 Location: Left arm, BP Patient Position: Sitting) 71 97.8 °F (36.6 °C) (Temporal) 16 5' 3.5\" (1.613 m) Weight(growth percentile) SpO2 BMI Smoking Status 128 lb 3.2 oz (58.2 kg) 98% 22.35 kg/m2 Light Tobacco Smoker BMI and BSA Data Body Mass Index Body Surface Area  
 22.35 kg/m 2 1.61 m 2 Preferred Pharmacy Pharmacy Name Phone [address] [address] [place] [address] [place] [phone] Your Updated Medication List  
  
   
This list is accurate as of: 4/20/17 12:08 PM.  Always use your most recent med list.  
  
  
  
  
 aspirin delayed-release 81 mg tablet Take  by mouth daily. I-CAPS PO Take  by mouth. Introducing [facility] SERVICES! Dear Dalton Krabbe: Thank you for requesting a PopJamt account.   Our records indicate that you already have an active Etable account. You can access your account anytime at [email or web address] Did you know that you can access your hospital and ER discharge instructions at any time in Etable? You can also review all of your test results from your hospital stay or ER visit. Additional Information If you have questions, please visit the Frequently Asked Questions section of the Etable website at [email or web address] Remember, Etable is NOT to be used for urgent needs. For medical emergencies, dial 911. Now available from your iPhone and Android! Please provide this summary of care documentation to your next provider. Your primary care clinician is listed as Kesha Martinez. If you have any questions after today's visit, please call [phone].

## 2017-04-21 ENCOUNTER — OFFICE VISIT (OUTPATIENT)
Dept: SURGERY | Age: 77
End: 2017-04-21

## 2017-04-21 VITALS
HEIGHT: 64 IN | SYSTOLIC BLOOD PRESSURE: 134 MMHG | BODY MASS INDEX: 21.66 KG/M2 | HEART RATE: 74 BPM | WEIGHT: 126.9 LBS | DIASTOLIC BLOOD PRESSURE: 85 MMHG

## 2017-04-21 DIAGNOSIS — C44.310 BCC (BASAL CELL CARCINOMA), FACE: Primary | ICD-10-CM

## 2017-04-21 NOTE — PROGRESS NOTES
Unknown how long area on nose but noticed it when he got new glasses at the end o the year. No bleeding or growth noted. No pain    EXAM:  6 mm nodule on center upper nose(see picture)    Betadine prep  1% Xylocaine with EPI  Excision   With Punch 7  Mm  Closed with  6-0    Nylon  Dressed with triple antibiotic ointment and bandaid.   RTO 5/2/17   For suture removal.  Path sent    Electronically signed by Bam Trujillo MD

## 2017-05-02 ENCOUNTER — OFFICE VISIT (OUTPATIENT)
Dept: SURGERY | Age: 77
End: 2017-05-02

## 2017-05-02 VITALS
BODY MASS INDEX: 21.51 KG/M2 | WEIGHT: 126 LBS | HEART RATE: 76 BPM | HEIGHT: 64 IN | DIASTOLIC BLOOD PRESSURE: 91 MMHG | SYSTOLIC BLOOD PRESSURE: 151 MMHG

## 2017-05-02 DIAGNOSIS — C44.310 BCC (BASAL CELL CARCINOMA), FACE: Primary | ICD-10-CM

## 2017-05-02 NOTE — PROGRESS NOTES
Pathology discussed with patient, BCC all excised  Wound looks good.   Sutures out  Steri-stripped  RTO prn

## 2017-05-24 ENCOUNTER — TELEPHONE (OUTPATIENT)
Dept: FAMILY MEDICINE CLINIC | Age: 77
End: 2017-05-24

## 2017-05-24 ENCOUNTER — OFFICE VISIT (OUTPATIENT)
Dept: FAMILY MEDICINE CLINIC | Age: 77
End: 2017-05-24

## 2017-05-24 VITALS
RESPIRATION RATE: 18 BRPM | SYSTOLIC BLOOD PRESSURE: 131 MMHG | BODY MASS INDEX: 21.51 KG/M2 | HEART RATE: 82 BPM | DIASTOLIC BLOOD PRESSURE: 75 MMHG | TEMPERATURE: 97.4 F | OXYGEN SATURATION: 97 % | HEIGHT: 64 IN | WEIGHT: 126 LBS

## 2017-05-24 DIAGNOSIS — R06.09 DOE (DYSPNEA ON EXERTION): Primary | ICD-10-CM

## 2017-05-24 DIAGNOSIS — C64.1 RENAL CELL CANCER, RIGHT (HCC): ICD-10-CM

## 2017-05-24 DIAGNOSIS — R07.81 PLEURITIC CHEST PAIN: ICD-10-CM

## 2017-05-24 DIAGNOSIS — Z86.711 HISTORY OF PULMONARY EMBOLUS (PE): ICD-10-CM

## 2017-05-24 NOTE — TELEPHONE ENCOUNTER
Spoke with patient, has had a cough  For several weeks and been treated, started exercising, woke with sharp pain under left arm into his back, hurts with a deep breath. No other symptoms. Noted recent stress test. Told to come to office.

## 2017-05-24 NOTE — MR AVS SNAPSHOT
Visit Information Date & Time Provider Department Dept. Phone Encounter #  
 5/24/2017 11:10 AM Aden Doherty MD [address] [place] [address] [id number] Follow-up Instructions Return if symptoms worsen or fail to improve. Routing History Follow-up and Disposition History Your Appointments 6/29/2017  3:00 PM  
ESTABLISHED PATIENT with Melida Arthur MD  
[address] ([place]) Appt Note: 2 mo F/U  
 [address] [address]  
[address] [facility] [address] [address] Upcoming Health Maintenance Date Due  
 GLAUCOMA SCREENING Q2Y 2/7/2005 Pneumococcal 65+ High/Highest Risk (1 of 2 - PCV13) 2/7/2005 MEDICARE YEARLY EXAM 2/7/2005 INFLUENZA AGE 9 TO ADULT 8/1/2017 DTaP/Tdap/Td series (2 - Td) 10/7/2025 Allergies as of 5/24/2017  Review Complete On: 5/24/2017 By: Aden Doherty MD  
 No Known Allergies Current Immunizations  Never Reviewed Name Date Influenza Vaccine 11/4/2016, 10/7/2015 12:17 PM  
 Tdap 10/7/2015 12:19 PM  
 Zoster Vaccine, Live 4/4/2011 Not reviewed this visit You Were Diagnosed With   
  
 Codes Comments BEJARANO (dyspnea on exertion)    -  Primary ICD-10-CM: R06.09 
ICD-9-CM: 786.09 Renal cell cancer, right     ICD-10-CM: C64.1 ICD-9-CM: 189.0 History of pulmonary embolus (PE)     ICD-10-CM: J47.352 ICD-9-CM: V12.55 Pleuritic chest pain     ICD-10-CM: R07.81 ICD-9-CM: 786.52 Vitals BP Pulse Temp Resp Height(growth percentile) Weight(growth percentile) 131/75 82 97.4 °F (36.3 °C) (Oral) 18 5' 3.5\" (1.613 m) 126 lb (57.2 kg) SpO2 BMI Smoking Status 97% 21.97 kg/m2 Light Tobacco Smoker BMI and BSA Data Body Mass Index Body Surface Area  
 21.97 kg/m 2 1.6 m 2 Preferred Pharmacy Pharmacy Name Phone [address] [facility], [address] [place] [phone] Your Updated Medication List  
  
   
This list is accurate as of: 5/24/17 12:21 PM.  Always use your most recent med list.  
  
  
  
  
 aspirin delayed-release 81 mg tablet Take  by mouth daily. I-CAPS PO Take  by mouth. We Performed the Following D DIMER [id number] CPT(R)] METABOLIC PANEL, BASIC [31110 CPT(R)] Follow-up Instructions Return if symptoms worsen or fail to improve. To-Do List   
 05/24/2017 Imaging:  XR CHEST PA LAT Introducing [facility]! Dear Radha Acuña: Thank you for requesting a Peach Payments account. Our records indicate that you already have an active Peach Payments account. You can access your account anytime at [email or web address] Did you know that you can access your hospital and ER discharge instructions at any time in Peach Payments? You can also review all of your test results from your hospital stay or ER visit. Additional Information If you have questions, please visit the Frequently Asked Questions section of the Peach Payments website at [email or web address] Remember, Peach Payments is NOT to be used for urgent needs. For medical emergencies, dial 911. Now available from your iPhone and Android! Please provide this summary of care documentation to your next provider. Your primary care clinician is listed as Tana Neal. If you have any questions after today's visit, please call [phone].

## 2017-05-24 NOTE — TELEPHONE ENCOUNTER
Lakeshia Brantley,    Could you please call Anabel Jim back at [phone] in regards to some chest pain he is having. Thank you.

## 2017-05-24 NOTE — PROGRESS NOTES
D-Dimer is POS. GFR improved, now 51. Contacted xray dept. GFR good enough for contrast. Will order. Start eliquis 5mg BID sample today.

## 2017-05-24 NOTE — PROGRESS NOTES
Randy Durbin. is a 68 y.o. male presenting for/with:    Shortness of Breath    HPI:  Symptoms include pleuritic chest pain L flank/mid chest. Was able to work out hard day before sx onset, felt fine after workout, but next day felt pain with deep breath. Some cough, with pain with deep cough. Gradually worsening. Evaluation to date: none. Treatment to date: ASA 81mg qd. Has hx of PE    PMH, SH, Medications/Allergies: reviewed, on chart. ROS:  Constitutional: No fever, chills or weight loss  Respiratory: No cough, SOB   CV: No chest pain or Palpitations    Visit Vitals    /75    Pulse 82    Temp 97.4 °F (36.3 °C) (Oral)    Resp 18    Ht 5' 3.5\" (1.613 m)    Wt 126 lb (57.2 kg)    SpO2 97%    BMI 21.97 kg/m2     Wt Readings from Last 3 Encounters:   05/24/17 126 lb (57.2 kg)   05/02/17 126 lb (57.2 kg)   04/21/17 126 lb 14.4 oz (57.6 kg)     Physical Examination: General appearance - alert, well appearing, and in no distress  Mental status - alert, oriented to person, place, and time  Eyes - pupils equal and reactive, extraocular eye movements intact  ENT - bilateral external ears and nose normal. Normal lips  Neck - supple, no significant adenopathy, no thyromegaly or mass  Lymphatics - no palpable lymphadenopathy, no hepatosplenomegaly  Chest - mild decreased breath sounds LLL on auscultation, no wheezes, rales or rhonchi, dullness to percussion LLL to base and ttp to chest wall laterally L costovertebral angle. symmetric air entry  Heart - normal rate, regular rhythm, normal S1, S2, no murmurs, rubs, clicks or gallops  Abd - NABS. SNT, no HSM/Mass. No ttp to RUQ or gallbladder area. Extremities - peripheral pulses normal, no pedal edema, no clubbing or cyanosis    A/P:  Pleuritic chest pain with some chest wall ttp in pt with prior hx of PE  GFR a little low for a CT angiogram, so will start with a D-dimer.  If POS, will see if GFR good enough (rpt BMP today), if is, will get CT angio Chest, but if not, will set up for v/q scan. Samples eliquis given to pt to hold, if D-dimer POS, will empirically start eliquis while awaiting confirmation study. Also check CXR to eval for dullness/atelectasis.

## 2017-05-25 PROBLEM — R07.81 PLEURITIC CHEST PAIN: Status: ACTIVE | Noted: 2017-05-24

## 2017-05-25 PROBLEM — I26.99 BILATERAL PULMONARY EMBOLISM (HCC): Status: ACTIVE | Noted: 2017-05-24

## 2017-06-29 NOTE — PROGRESS NOTES
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 Experienced post op PE and was anticoagulated with Warfarin for nearly 18 months and then discontinued Warfarin. He did well until May 2017 when he again experienced PE and was briefly admitted to [address] [place] [address] and then discharged on Apixaban which he still takes. Anabel Jim boated a 50 pound [employer] 2 weeks ago off [facility]. Remains frustrated that his stamina is just not what is was 2 years ago. Walks daily and works out at [employer]. He is due for SAWV    No Known Allergies    Current Outpatient Prescriptions   Medication Sig    apixaban (ELIQUIS) 5 mg tablet Take 1 Tab by mouth two (2) times a day. Indications: possible lung blood clot    ANTIOX#10/OM3/DHA/EPA/LUT/ZEAX (I-CAPS PO) Take  by mouth. No current facility-administered medications for this visit. Past Medical History:   Diagnosis Date    Actinic keratosis     Calculus of kidney     Chronic pain     Affecting lower back,Grade 3L5/S1 spondylisthesis    Hypercholesterolemia     Palpitations     PE (pulmonary embolism) 09/01/2014    Post right nephrectomy    Renal carcinoma ([facility] [address]     Dx'd inSept.2014, S/P surgical resection         ROS:  Denies fever, chills, cough, chest pain, SOB,  nausea, vomiting, or diarrhea. Denies wt loss, wt gain, hemoptysis, hematochezia or melena.     Physical Examination:    /76 (BP 1 Location: Left arm, BP Patient Position: Sitting)  Pulse 80  Resp 16  Wt 126 lb (57.2 kg)  SpO2 99%  BMI 21.63 kg/m2    General: Alert and Ox3, Fluent speech  HEENT:  NC/AT, EOMI, OP: clear  Neck:  Supple, no adenopathy, JVD, mass or bruit  Chest:  Clear to Ausculation, without wheezes, rales, rubs or ronchi  Cardiac: RRR  Abdomen:  +BS, soft, nontender without palpable HSM  Extremities:  No cyanosis, clubbing or edema  Neurologic:  Ambulatory without assist, CN 2-12 grossly intact. Moves all extremities. Skin: no rash  Lymphadenopathy: no cervical or supraclavicular nodes      ASSESSMENT AND PLAN:     1. PE:  Remains on Apixaban and I have recommended that he remain on this for at least 20 years. 2.  He is due for SAWV  3.  P 13 today      Orders Placed This Encounter    PNEUMOCOCCAL CONJ VACCINE 13 VALENT IM   Nadiya Crisostomo MD, FACP        ______________________________________________________________________    Miles Hutchinson. is a 68 y.o. male and presents for annual Medicare Wellness Visit. Problem List: Reviewed with patient and discussed risk factors. Patient Active Problem List   Diagnosis Code    H/O renal cell carcinoma Z85.528    History of pulmonary embolism Z86.711    Calculus of kidney N20.0    Renal carcinoma ([address] C64.9    Hypercholesterolemia E78.00    Anticoagulant long-term use Z79.01    Family history of malignant melanoma of skin Z80.8    AK (actinic keratosis) L57.0    Bilateral pulmonary embolism (HCC) I26.99    Pleuritic chest pain R07.81       Current medical providers:  Patient Care Team:  Connie Medel MD as PCP - General (Internal Medicine)  Terese Morales MD (Surgery)    [facility], [address] [place] [address], Medications/Allergies: reviewed, on chart. Male Alcohol Screening: On any occasion during the past 3 months, have you had more than 4 drinks containing alcohol? No    Do you average more than 14 drinks per week? No    ROS:  Constitutional: No fever, chills or weight loss  Respiratory: No cough, SOB   CV: No chest pain or Palpitations    Objective:  Visit Vitals    /76 (BP 1 Location: Left arm, BP Patient Position: Sitting)    Pulse 80    Resp 16    Wt 126 lb (57.2 kg)    SpO2 99%    BMI 21.63 kg/m2    Body mass index is 21.63 kg/(m^2).     Assessment of cognitive impairment: Alert and oriented x 3    Depression Screen:   PHQ over the last two weeks 6/29/2017   PHQ Not Done -   Little interest or pleasure in doing things Not at all   Feeling down, depressed or hopeless Not at all   Total Score PHQ 2 0       Fall Risk Assessment:    Fall Risk Assessment, last 12 mths 6/29/2017   Able to walk? Yes   Fall in past 12 months? No       Functional Ability:   Does the patient exhibit a steady gait? yes   How long did it take the patient to get up and walk from a sitting position? 12 sec   Is the patient self reliant?  (ie can do own laundry, meals, household chores)  yes     Does the patient handle his/her own medications? yes     Does the patient handle his/her own money? yes     Is the patients home safe (ie good lighting, handrails on stairs and bath, etc.)? yes     Did you notice or did patient express any hearing difficulties? yes     Did you notice or did patient express any vision difficulties? no       Advance Care Planning:   Patient was offered the opportunity to discuss advance care planning:  yes     Does patient have an Advance Directive:  yes   If no, did you provide information on Caring Connections?  no       Plan:      Orders Placed This Encounter    PNEUMOCOCCAL CONJ VACCINE 13 VALENT IM    [facility] Maintenance   Topic Date Due    GLAUCOMA SCREENING Q2Y  02/07/2005    Pneumococcal 65+ High/Highest Risk (1 of 2 - PCV13) 02/07/2005    MEDICARE YEARLY EXAM  02/07/2005    INFLUENZA AGE 9 TO ADULT  08/01/2017    DTaP/Tdap/Td series (2 - Td) 10/07/2025    ZOSTER VACCINE AGE 60>  Completed       *Patient verbalized understanding and agreement with the plan. A copy of the After Visit Summary with personalized health plan was given to the patient today.

## 2017-06-29 NOTE — ACP (ADVANCE CARE PLANNING)
In the event that he is unable to speak for himself, he asks that we contact his wife, Brittany Jarrett, and she can be reached at [address] [place] [address]

## 2017-06-29 NOTE — MR AVS SNAPSHOT
Visit Information Date & Time Provider Department Dept. Phone Encounter #  
 6/29/2017  3:00 PM Iza Wynn, [address] [place] [address] [phone] [id number] Follow-up Instructions Return in about 3 months (around 9/29/2017) for Follow up. Upcoming Health Maintenance Date Due  
 GLAUCOMA SCREENING Q2Y 2/7/2005 Pneumococcal 65+ High/Highest Risk (1 of 2 - PCV13) 2/7/2005 MEDICARE YEARLY EXAM 2/7/2005 INFLUENZA AGE 9 TO ADULT 8/1/2017 DTaP/Tdap/Td series (2 - Td) 10/7/2025 Allergies as of 6/29/2017  Review Complete On: 6/29/2017 By: Iza Wynn MD  
 No Known Allergies Current Immunizations  Never Reviewed Name Date Influenza Vaccine 11/4/2016, 10/7/2015 12:17 PM  
 Pneumococcal Polysaccharide (PPSV-23) 11/23/2010, 10/28/2005 Tdap 10/7/2015 12:19 PM  
 Zoster Vaccine, Live 4/4/2011 Not reviewed this visit You Were Diagnosed With   
  
 Codes Comments Medicare annual wellness visit, subsequent    -  Primary ICD-10-CM: Z00.00 ICD-9-CM: V70.0 Bilateral pulmonary embolism (HCC)     ICD-10-CM: I26.99 
ICD-9-CM: 415.19 [facility]     ICD-10-CM: C64.9 ICD-9-CM: 189. 0 Vitals BP Pulse Resp Weight(growth percentile) SpO2 BMI  
 150/76 (BP 1 Location: Left arm, BP Patient Position: Sitting) 80 16 126 lb (57.2 kg) 99% 21.63 kg/m2 Smoking Status Light Tobacco Smoker BMI and BSA Data Body Mass Index Body Surface Area  
 21.63 kg/m 2 1.61 m 2 Preferred Pharmacy Pharmacy Name Phone [address], [address] [place] [address] Angely Cox [phone] Your Updated Medication List  
  
   
This list is accurate as of: 6/29/17  3:56 PM.  Always use your most recent med list.  
  
  
  
  
 apixaban 5 mg tablet Commonly known as:  Juan Schwab Take 1 Tab by mouth two (2) times a day. Indications: possible lung blood clot I-CAPS PO Take  by mouth. Follow-up Instructions Return in about 3 months (around 9/29/2017) for Follow up. Introducing [facility] & HEALTH SERVICES! Dear Guanakito Rajan: Thank you for requesting a TrewCap account. Our records indicate that you already have an active TrewCap account. You can access your account anytime at [email or web address] Did you know that you can access your hospital and ER discharge instructions at any time in TrewCap? You can also review all of your test results from your hospital stay or ER visit. Additional Information If you have questions, please visit the Frequently Asked Questions section of the TrewCap website at [email or web address] Remember, TrewCap is NOT to be used for urgent needs. For medical emergencies, dial 911. Now available from your iPhone and Android! Please provide this summary of care documentation to your next provider. Your primary care clinician is listed as Gopi Segura. If you have any questions after today's visit, please call [phone].

## 2017-08-22 NOTE — TELEPHONE ENCOUNTER
Spoke with patient, he just wanted Dr. Mandy Thakur to know about the study (CT) because he always calls him with results.

## 2017-09-01 PROBLEM — C64.9 RENAL CARCINOMA (HCC): Status: ACTIVE | Noted: 2017-01-01

## 2017-09-01 NOTE — MR AVS SNAPSHOT
Visit Information Date & Time Provider Department Dept. Phone Encounter #  
 9/1/2017  1:00 PM Maryam Espino MD [facility] [id number] Follow-up Instructions Return in about 6 weeks (around 10/13/2017). Follow-up and Disposition History Upcoming Health Maintenance Date Due INFLUENZA AGE 9 TO ADULT 8/1/2017 MEDICARE YEARLY EXAM 6/30/2018 GLAUCOMA SCREENING Q2Y 6/2/2019 DTaP/Tdap/Td series (2 - Td) 10/7/2025 Allergies as of 9/1/2017  Review Complete On: 9/1/2017 By: Maryam Espino MD  
 No Known Allergies Current Immunizations  Never Reviewed Name Date Influenza High Dose Vaccine PF 9/1/2017  1:37 PM  
 Influenza Vaccine 11/4/2016, 10/7/2015 12:17 PM  
 Pneumococcal Conjugate (PCV-13) 6/29/2017  4:05 PM  
 Pneumococcal Polysaccharide (PPSV-23) 11/23/2010, 10/28/2005 Tdap 10/7/2015 12:19 PM  
 Zoster Vaccine, Live 4/4/2011 Not reviewed this visit You Were Diagnosed With   
  
 Codes Comments Bilateral pulmonary embolism (HCC)    -  Primary ICD-10-CM: I26.99 
ICD-9-CM: 415.19 Encounter for immunization     ICD-10-CM: L39 ICD-9-CM: V03.89 Renal carcinoma, right ([facility] [address]     ICD-10-CM: C64.1 ICD-9-CM: 189. 0 Vitals BP Pulse Resp Weight(growth percentile) SpO2 BMI  
 108/64 (BP 1 Location: Left arm, BP Patient Position: Sitting) 92 20 123 lb 3.2 oz (55.9 kg) 93% 21.15 kg/m2 Smoking Status Light Tobacco Smoker BMI and BSA Data Body Mass Index Body Surface Area  
 21.15 kg/m 2 1.59 m 2 Preferred Pharmacy Pharmacy Name Phone [address], [address] [phone] Your Updated Medication List  
  
   
This list is accurate as of: 9/1/17  2:41 PM.  Always use your most recent med list.  
  
  
  
  
 apixaban 5 mg tablet Commonly known as:  Olivier Jim Take 1 Tab by mouth two (2) times a day. Indications: possible lung blood clot I-CAPS PO Take  by mouth. We Performed the Following ADMIN INFLUENZA VIRUS VAC [ HCP] CBC WITH AUTOMATED DIFF [69882 CPT(R)] INFLUENZA VIRUS VACCINE, HIGH DOSE SEASONAL, PRESERVATIVE FREE [10373 CPT(R)] METABOLIC PANEL, COMPREHENSIVE [14718 CPT(R)] REFERRAL TO PULMONARY DISEASE [SZX14 Custom] Comments:  
 Please evaluate patient for ongoing BEJARANO; history of Pulmonary Emboli and renal cell carcinoma. Thanks Maribell Para Follow-up Instructions Return in about 6 weeks (around 10/13/2017). To-Do List   
 Around 09/14/2017 Imaging:  CT CHEST W CONT Referral Information Referral ID Referred By Referred To  
  
 [id number] Eduar Stauffer [facility] [address], [address] Visits Status Start Date End Date 1 New Request 9/1/17 9/1/18 If your referral has a status of pending review or denied, additional information will be sent to support the outcome of this decision. Introducing [facility] & HEALTH SERVICES! Dear Juanito Huerta: Thank you for requesting a PharmAbcine account. Our records indicate that you already have an active PharmAbcine account. You can access your account anytime at [email or web address]. [email or web address] Did you know that you can access your hospital and ER discharge instructions at any time in PharmAbcine? You can also review all of your test results from your hospital stay or ER visit. Additional Information If you have questions, please visit the Frequently Asked Questions section of the PharmAbcine website at [email or web address]. [email or web address]/. Remember, PharmAbcine is NOT to be used for urgent needs. For medical emergencies, dial 911. Now available from your iPhone and Android! Please provide this summary of care documentation to your next provider. Your primary care clinician is listed as Edward Osorio. If you have any questions after today's visit, please call [phone].

## 2017-09-01 NOTE — PROGRESS NOTES
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 Experienced post op PE and was anticoagulated with Warfarin for nearly 18 months and then discontinued Warfarin. He did well until May 2017 when he again experienced PE and was briefly admitted to [facility] and then discharged on Apixaban which he still takes. Continues to complain of BEJARANO and cough. Occasional sputum. No hemoptysis. Previous cardiac workup has included a nuclear stress test and ECHO in early 2017. EF 70%. No valvular abnormalities. Still walking daily but frustrated that his breathing and stamina are not better. No Known Allergies    Current Outpatient Prescriptions   Medication Sig    apixaban (ELIQUIS) 5 mg tablet Take 1 Tab by mouth two (2) times a day. Indications: possible lung blood clot    ANTIOX#10/OM3/DHA/EPA/LUT/ZEAX (I-CAPS PO) Take  by mouth. No current facility-administered medications for this visit. Past Medical History:   Diagnosis Date    Actinic keratosis     Calculus of kidney     Chronic pain     Affecting lower back,Grade 3L5/S1 spondylisthesis    Hypercholesterolemia     Palpitations     PE (pulmonary embolism) 09/01/2014    Post right nephrectomy    Renal carcinoma ([facility] [address]     Dx'd inSept.2014, S/P surgical resection         ROS:  Denies fever, chills, cough, chest pain, SOB,  nausea, vomiting, or diarrhea. Denies wt loss, wt gain, hemoptysis, hematochezia or melena.     Physical Examination:    /64 (BP 1 Location: Left arm, BP Patient Position: Sitting)  Pulse 92  Resp 20  Wt 123 lb 3.2 oz (55.9 kg)  SpO2 93%  BMI 21.15 kg/m2    General: Alert and Ox3, Fluent speech  HEENT:  NC/AT, EOMI, OP: clear  Neck:  Supple, no adenopathy, JVD, mass or bruit  Chest:  Clear to Ausculation, without wheezes, rales, rubs or ronchi  Cardiac: RRR  Abdomen:  +BS, soft, nontender without palpable HSM  Extremities:  No cyanosis, clubbing or edema  Neurologic:  Ambulatory without assist, CN 2-12 grossly intact. Moves all extremities. Skin: no rash  Lymphadenopathy: no cervical or supraclavicular nodes      ASSESSMENT AND PLAN:     1. Renal cell carcinoma  2. Pulmonary emboli with infarction  3. Abnormal CT scan of the Chest May 2017:  Repeat CT and refer to Pulmonary for expert consulation. 4.  Flu vaccine today  5. Continue Eliquis  6. RTC after evaluations are complete    Orders Placed This Encounter    CT CHEST W CONT     Standing Status:   Future     Standing Expiration Date:   10/1/2018     Scheduling Instructions:      [address] [facility]     Order Specific Question:   Is Patient Allergic to Contrast Dye? Answer:   Unknown     Order Specific Question:   Stat POC Creatinine as needed for Radiology Policy     Answer:    Yes    INFLUENZA VIRUS VACCINE, HIGH DOSE SEASONAL, PRESERVATIVE FREE    CBC WITH AUTOMATED DIFF    METABOLIC PANEL, COMPREHENSIVE    REFERRAL TO PULMONARY DISEASE     Referral Priority:   Routine     Referral Type:   Consultation     Referral Reason:   Specialty Services Required     Referral Location:   [facility]     Referred to Provider:   Alber Carr MD   [facility]ell [facility], Ayo Brennan

## 2017-09-06 NOTE — PROGRESS NOTES
CT scan suggests tumor emboli. We will need expert consultation with Oncology to determine how to proceed. A/P:  Very likely metastatic renal cell carcinoma (lungs)           Refer to Dr Angel García for advice on further evaluation.     Fernanda Childress

## 2017-09-07 NOTE — MR AVS SNAPSHOT
Visit Information Date & Time Provider Department Dept. Phone Encounter #  
 9/7/2017  2:30 PM Katie Howell MD [facility]  Follow-up Instructions Return in about 11 days (around 9/18/2017) for office visit and evaluation. Your Appointments 9/18/2017 10:30 AM  
Follow Up with Katie Howell MD  
[facility] Appt Note: 11 Day F/U  
 [address] [facility] [id number] [phone]  
  
   
 [address] Upcoming Health Maintenance Date Due  
 MEDICARE YEARLY EXAM 6/30/2018 GLAUCOMA SCREENING Q2Y 6/2/2019 DTaP/Tdap/Td series (2 - Td) 10/7/2025 Allergies as of 9/7/2017  Review Complete On: 9/7/2017 By: Katie Howell MD  
 No Known Allergies Current Immunizations  Reviewed on 9/7/2017 Name Date Influenza High Dose Vaccine PF 9/1/2017  1:37 PM  
 Influenza Vaccine 11/4/2016, 10/7/2015 12:17 PM  
 Pneumococcal Conjugate (PCV-13) 6/29/2017  4:05 PM  
 Pneumococcal Polysaccharide (PPSV-23) 11/23/2010, 10/28/2005 Tdap 10/7/2015 12:19 PM  
 Zoster Vaccine, Live 4/4/2011 Reviewed by Thomas Doss RN on 9/7/2017 at  2:35 PM  
You Were Diagnosed With   
  
 Codes Comments [facility]    -  Primary ICD-10-CM: C64.9 ICD-9-CM: 189.0 Bilateral pulmonary embolism (HCC)     ICD-10-CM: I26.99 
ICD-9-CM: 415.19 Anticoagulant long-term use     ICD-10-CM: Z79.01 
ICD-9-CM: V58.61 Vitals BP Pulse Temp Resp Height(growth percentile) Weight(growth percentile) 130/73 85 98 °F (36.7 °C) (Oral) 17 5' 4\" (1.626 m) 122 lb 9.6 oz (55.6 kg) SpO2 BMI Smoking Status 94% 21.04 kg/m2 Former Smoker BMI and BSA Data Body Mass Index Body Surface Area 21.04 kg/m 2 1.58 m 2 Preferred Pharmacy Pharmacy Name Phone [address], [address] [facility] [phone] Your Updated Medication List  
  
   
This list is accurate as of: 9/7/17  3:46 PM.  Always use your most recent med list.  
  
  
  
  
 apixaban 5 mg tablet Commonly known as:  Margarie Tone Take 1 Tab by mouth two (2) times a day. Indications: possible lung blood clot I-CAPS PO Take  by mouth. Follow-up Instructions Return in about 11 days (around 9/18/2017) for office visit and evaluation. To-Do List   
 09/08/2017 Imaging:  DUPLEX LOWER EXT VENOUS BILAT   
  
 09/14/2017 Imaging:  MRI BRAIN W WO CONT   
  
 09/14/2017 Imaging:  PET/CT TUMOR IMAGE SKULL THIGH W (INI) Introducing [facility] & [facility] SERVICES! Dear Jennifer Rodriguez: Thank you for requesting a Adonit account. Our records indicate that you already have an active Adonit account. You can access your account anytime at [email or web address]. [email or web address] (formerly [email or web address])/[employer] Did you know that you can access your hospital and ER discharge instructions at any time in Adonit? You can also review all of your test results from your hospital stay or ER visit. Additional Information If you have questions, please visit the Frequently Asked Questions section of the Adonit website at [email or web address] Remember, Adonit is NOT to be used for urgent needs. For medical emergencies, dial 911. Now available from your iPhone and Android! Please provide this summary of care documentation to your next provider. Your primary care clinician is listed as Socrates Amato. If you have any questions after today's visit, please call [phone].

## 2017-09-07 NOTE — PROGRESS NOTES
Jerilyn Ring is a 68 y.o. male new patient being seen today by Dr Court Segovia for renal cell carcinoma. Patient smoked 1 cigar daily unitl last week when he quit. In may of this year he developed a PE after being off coumadin for 9 months. He has a HX of PE in September 2014, following surgery for kidney cancer. Patient has been on eliquist since the end of may 2017.

## 2017-09-11 NOTE — PROGRESS NOTES
Subjective: The patient is a 68year old  male, originally seen because of renal cell carcinoma. He now returns for follow up. He was last seen on 12/18/14. History of Present Illness:  Patient developed hematuria in August of 2014. A CT scan of the abdomen and pelvis showed a 4.3 cm mass-like lesion in the upper right kidney containing calcifications. There was expansion on the IVC and right renal vein. There was a 3.8 x 2.7 cm  lesion in the right UPJ/proximal ureter, representing a primary transitional cell carcinoma or extension of the renal neoplasm. Clot was considered less likely. There was also diverticulosis and a non obstructing left renal stone. An MRI of the abdomen showed a large right renal mass without apparent extension into the right renal pelvis. There was thrombus or tumor thrombus within the IVC and right renal vein with a possible extension into the proximal left renal vein. On 09/10/14 the patient underwent a right radical nephrectomy and resection of the vena cava tumor and partial resection of the vena cava with reconstruction at [facility] in [facility]. There was no evidence of intraabdominal metastases. The liver appeared to be normal.  There was no extrarenal extension. The adrenal gland was left attached to the kidney. There was extension of the tumor into the IVC and right renal vein. After the resection there did not appear to be any residual cancer, but there could have been gross spillage due to the amount of tumor that was removed. Pathology report revealed an 8 cm clear cell renal carcinoma with 2.05 cm nodules identified on the uninvolved portion of the kidney. The tumor invaded the hilum, the right renal vein and showed focal invasion into the vena cava. No sarcomatoid features were identified and tumor was felt to be a grade III malignancy. The perinephric margin and ureter margins were negative. Lymphatic invasion was present.   One lymph node was positive for tumor. The stage was PT3 BN1 and the adrenal gland was negative. The postoperative course was complicated by a pulmonary embolus. The patient did not receive any type of adjuvant therapy. He was followed by  [facility] and by Dr. Kwame Ruiz. A CT scan of the abdomen and pelvis done on 16 was negative. In May of 2017 the patient developed shortness of breath. A CT angiogram performed on 17 showed extensive bilateral pulmonary emboli with right upper lobe nodule opacities, possibly infarcts, or infection or metastases. A CT scan of the abdomen was negative for metastatic disease. Patient was treated with Apixaban 5 mg b.i.d., but the shortness of breath persisted. It actually worsened and a CT angiogram was repeated on 17. This showed extensive pulmonary emboli bilaterally, which had not changed significantly since May of 2017, suggesting the presence of tumor emboli. There were also bilateral enlarging pulmonary nodules favoring metastatic disease. The patient had lost weight, namely 4 pounds, over the last several months. There has been no hemoptysis, no fever, no night sweats ,but the shortness of breath has continued. Past Medical History:  Medical - elevated lipids, hypertension, palpitations, renal stones and actinic keratoses. Surgery - appendectomy and right nephrectomy. Medications:  Eliquis and vitamins. Allergies:  None. Transfusions:  None. Personal/Social History:  Patient is retired . He is . Three children are alive and well. Has smoked one cigar every day. Drinks alcohol socially. Family Medical History:  One brother alive and well. Mother  at age 80 with a postpolio syndrome. Father  age 80 from abdominal aneurysm.      Review of Systems:  Weight has been stable, no fevers or night sweats, denies headache, seizures, eye or ear changes, no chronic cough, no sputum production, hemoptysis, chest pain, PND, angina. No nausea, vomiting, diarrhea, melena, hematochezia, constipation, change in bowel habits, hematemesis, hematuria, nocturia, dysuria, urinary frequency, urinary hesitancy, musculoskeletal aching, temperature preferences, polyuria, polydipsia or easy bruisability. No bleeding, no allergies, no psychiatric issues, no depression. Physical Examination:    GENERAL:  Patient is a well developed, well nourished white gentleman in no acute distress. VITALS:  /73, P 85 and regular, T 98, , R 17 and regular. HEENT:  Head normocephalic, atraumatic. Pupils equal, round and reactive to light and accommodation. Extraocular muscles were intact. Fundi not visualized. Conjunctivae were pale. Sclerae non icteric. Ears, nose and throat were normal.  Tongue was papillated. NECK:  Supple. Thyroid normal.  No bruits heard over the carotid arteries. No jugular venous distention was demonstrated. Trachea was midline. NODES:  None felt. No cervical, supraclavicular, axillary, inguinal or epitrochlear adenopathy. BACK:  No tenderness over the LS spine. CHEST:  Clear, no rales, rhonchi or wheezes heard. HEART:  Regular rate and rhythm without murmurs, rubs, thrills or gallops. ABDOMEN:  Soft, no liver edge, spleen tip, masses or tenderness. Bowel sounds present. RECTAL: Deferred. :  Genitalia not examined. SKIN:  Warm and dry without petechiae or purpura. EXTREMITIES:  No edema. NEURO:  Intact. Cranial nerves intact. Plantars are downgoing. Laboratory:  CEA is 0.8, , CBC - WBC 11.7, HGB 10.9, HCT 34.4, plat 364k, 74 segs and 15 lymphs. CMP normal except for sugar of 106, BUN 31, creatinine 1.5, albumin 2.9, globulin 4.5. Impression:  1. PT3, N0, M0  cell carcinoma of the right kidney, rule out metastatic disease to the lungs. 2. Essential hypertension. 3. History of pulmonary emboli with recurrent disease.   4. Rule out tumor emboli and tumor thrombus. Plan:  1. I have scheduled a PET CT scan to further define the presence or absence of metastatic disease. 2. I have scheduled a bilateral lower extremity venous duplex study to see if the patient has had evidence for DVT in the legs precipitating pulmonary embolization. 3. The patient will return in one week's time. 4. He probably will need a hypercoagulable workup. 5. The patient could have tumor emboli or possibly the reason for the progressive pulmonary embolization could be inadequate anticoagulation and he may need a switch to full dose Lovenox. Louie Handy M.D.            Chris Jasso M.D.

## 2017-09-12 PROBLEM — C78.00 LUNG METASTASES (HCC): Status: ACTIVE | Noted: 2017-01-01

## 2017-09-12 PROBLEM — C64.1: Status: ACTIVE | Noted: 2017-01-01

## 2017-09-12 PROBLEM — C79.9: Status: ACTIVE | Noted: 2017-01-01

## 2017-09-18 NOTE — MR AVS SNAPSHOT
Visit Information Date & Time Provider Department Dept. Phone Encounter #  
 9/18/2017 10:30 AM Asuncion Abebe MD [facility]  Follow-up Instructions Return in about 10 days (around 9/28/2017) for office visit and evaluation. Upcoming Health Maintenance Date Due  
 MEDICARE YEARLY EXAM 6/30/2018 GLAUCOMA SCREENING Q2Y 6/2/2019 DTaP/Tdap/Td series (2 - Td) 10/7/2025 Allergies as of 9/18/2017  Review Complete On: 9/18/2017 By: Asuncion Abebe MD  
 No Known Allergies Current Immunizations  Reviewed on 9/7/2017 Name Date Influenza High Dose Vaccine PF 9/1/2017  1:37 PM  
 Influenza Vaccine 11/4/2016, 10/7/2015 12:17 PM  
 Pneumococcal Conjugate (PCV-13) 6/29/2017  4:05 PM  
 Pneumococcal Polysaccharide (PPSV-23) 11/23/2010, 10/28/2005 Tdap 10/7/2015 12:19 PM  
 Zoster Vaccine, Live 4/4/2011 Not reviewed this visit You Were Diagnosed With   
  
 Codes Comments [facility] clear [facility]    -  Primary ICD-10-CM: C64.1 ICD-9-CM: 189. 0 Vitals BP Pulse Temp Resp Height(growth percentile) Weight(growth percentile) 122/71 80 96.2 °F (35.7 °C) (Oral) 17 5' 4\" (1.626 m) 121 lb 12.8 oz (55.2 kg) SpO2 BMI Smoking Status 96% 20.91 kg/m2 Former Smoker BMI and BSA Data Body Mass Index Body Surface Area  
 20.91 kg/m 2 1.58 m 2 Preferred Pharmacy Pharmacy Name Phone [address], [address] [place] [address] Angely Guadarrama [place] [phone] Your Updated Medication List  
  
   
This list is accurate as of: 9/18/17 11:34 AM.  Always use your most recent med list.  
  
  
  
  
 apixaban 5 mg tablet Commonly known as:  Viviana Forrest Take 1 Tab by mouth two (2) times a day. Indications: possible lung blood clot I-CAPS PO Take  by mouth. Follow-up Instructions Return in about 10 days (around 9/28/2017) for office visit and evaluation. Introducing [facility]! Dear Haresh Brito: Thank you for requesting a [employer] account. Our records indicate that you already have an active [employer] account. You can access your account anytime at [email or web address] Did you know that you can access your hospital and ER discharge instructions at any time in [employer]? You can also review all of your test results from your hospital stay or ER visit. Additional Information If you have questions, please visit the Frequently Asked Questions section of the [employer] website at [email or web address] Remember, [employer] is NOT to be used for urgent needs. For medical emergencies, dial 911. Now available from your iPhone and Android! Please provide this summary of care documentation to your next provider. Your primary care clinician is listed as Trevor Giordano. If you have any questions after today's visit, please call [phone].

## 2017-09-19 PROBLEM — R06.02 SOB (SHORTNESS OF BREATH) ON EXERTION: Status: ACTIVE | Noted: 2017-01-01

## 2017-09-19 NOTE — PROGRESS NOTES
[facility] information for Sutent oral chemo reviewed with patient, questions answered. Reviewed safe handling, provided oral chemo handout, reviewed dosage instructions, and oral chemo calendar. Reviewed s/s and management of side effects. Consent for palliative therapy obtained. Patient requested to speak with Dr Giana Arevalo. Dr Prince Oven in to speak with patient.

## 2017-09-20 NOTE — TELEPHONE ENCOUNTER
Patient would like to speak with Hermelinda Orantes.        Also received call from brother You Miranda, He would also like to speak with Hermelinda Orantes or Dr. Yanni Aguila regarding his brothers diagnosis of cancer. [phone]

## 2017-09-22 NOTE — TELEPHONE ENCOUNTER
Would like to speak to Chuckie Farias about when he is to come in again. Thought he had an appt on Oct 5.

## 2017-09-24 NOTE — PROGRESS NOTES
Subjective: The patient is a 68year old  male with a history of renal cell carcinoma, being seen in follow up. His initial visit to this office was on 09/07/17.     History of Present Illness:  Patient developed hematuria in August of 2014. A CT scan of the abdomen and pelvis showed a 4.3 cm mass-like lesion in the upper right kidney containing calcifications. There was expansion on the IVC and right renal vein. There was a 3.8 x 2.7 cm  lesion in the right UPJ/proximal ureter, representing a primary transitional cell carcinoma or extension of the renal neoplasm. Clot was considered less likely. There was also diverticulosis and a non obstructing left renal stone. An MRI of the abdomen showed a large right renal mass without apparent extension into the right renal pelvis. There was thrombus or tumor thrombus within the IVC and right renal vein with a possible extension into the proximal left renal vein.     On 09/10/14 the patient underwent a right radical nephrectomy and resection of the vena cava tumor and partial resection of the vena cava with reconstruction at [facility] in [facility]. There was no evidence of intraabdominal metastases. The liver appeared to be normal.  There was no extrarenal extension. The adrenal gland was left attached to the kidney. There was extension of the tumor into the IVC and right renal vein. After the resection there did not appear to be any residual cancer, but there could have been gross spillage due to the amount of tumor that was removed. Pathology report revealed an 8 cm clear cell renal carcinoma with 2.05 cm nodules identified on the uninvolved portion of the kidney. The tumor invaded the hilum, the right renal vein and showed focal invasion into the vena cava. No sarcomatoid features were identified and tumor was felt to be a grade III malignancy. The perinephric margin and ureter margins were negative.   Lymphatic invasion was present. One lymph node was positive for tumor. The stage was PT3 BN1 and the adrenal gland was negative.     The postoperative course was complicated by a pulmonary embolus. The patient did not receive any type of adjuvant therapy. He was followed by Dr. Kim Dodge and by Dr. Miryam Vasquez. A CT scan of the abdomen and pelvis done on 01/20/16 was negative.      In May of 2017 the patient developed shortness of breath. A CT angiogram performed on 05/24/17 showed extensive bilateral pulmonary emboli with right upper lobe nodule opacities, possibly infarcts, or infection or metastases. A CT scan of the abdomen was negative for metastatic disease. Patient was treated with Apixaban 5 mg b.i.d., but the shortness of breath persisted. It actually worsened and a CT angiogram was repeated on 09/06/17. This showed extensive pulmonary emboli bilaterally, which had not changed significantly since May of 2017, suggesting the presence of tumor emboli. There were also bilateral enlarging pulmonary nodules favoring metastatic disease. The patient had lost weight, namely 4 pounds, over the last several months. There has been no hemoptysis, no fever, no night sweats ,but the shortness of breath has continued. The LDH today is 278, D-dimer slightly elevated at 495, fibrinogen elevated at 728. CBC - WBC 11.1, HGB 11.2, HCT 35.4, plat 365k, with 65 segs, 19 lymphs and 11 monos. CMP normal except for potassium 5.6, sugar 120, BUN 26, creatinine 1.3. The alk phos is normal, albumin is 2.8. CEA was 0.8. A bilateral lower extremity venous ultrasound study showed no evidence for DVT in the legs. An MRI scan of the brain was negative for acute findings. PET CT scan showed bilateral metastatic pulmonary emboli to the right pulmonary artery, right lower lobe pulmonary arteries and left lower lobe pulmonary arteries, demonstrating increased metabolic activity.   There was a 1 cm nodule in the superior segment of the right lobe, which did not demonstrate increased metabolic activity and could be inflammatory of infectious. There were no foci of abnormal hypermetabolism present within the bones. I reviewed the PET CT scan with Dr. Elmer Guzmán of interventional radiology. He discussed it with several of his colleagues and the general feeling of all the radiologists was that this PET scan was diagnostic of metastatic malignancy involving thrombus within the pulmonary arteries. It was not really certain how the tumor emboli got to these positions, considering that the primary tumor had been resected. It was also not felt that the study was adequate without contrast to be certain that foci of tumor could not be present within the abdomen, and therefore a contrast enhanced CT scan of the abdomen and pelvis was recommended. It was also recommended that an echocardiogram be obtained to measure right sided pressures. This was discussed in detail with the patient.     Past Medical History:  Medical - elevated lipids, hypertension, palpitations, renal stones and actinic keratoses. Surgery - appendectomy and right nephrectomy.     Medications:  Eliquis and vitamins.     Allergies:  None.     Transfusions:  None.     Personal/Social History:  Patient is retired . He is . Three children are alive and well. Has smoked one cigar every day. Drinks alcohol socially.     Family Medical History:  One brother alive and well. Mother  at age 80 with a postpolio syndrome. Father  age 80 from abdominal aneurysm.      Review of Systems:  Weight has been stable, no fevers or night sweats, denies headache, seizures, eye or ear changes, no chronic cough, no sputum production, hemoptysis, chest pain, PND, angina.   No nausea, vomiting, diarrhea, melena, hematochezia, constipation, change in bowel habits, hematemesis, hematuria, nocturia, dysuria, urinary frequency, urinary hesitancy, musculoskeletal aching, temperature preferences, polyuria, polydipsia or easy bruisability. No bleeding, no allergies, no psychiatric issues, no depression.     Physical Examination:    GENERAL:  Patient is a well developed, well nourished white male with mild shortness of breath. VITALS:  /71, P 86 and regular, T 96.2, R 17 and regular, . HEENT:  Head normocephalic, atraumatic. Pupils equal, round and reactive to light and accommodation. Extraocular muscles were intact. Fundi not visualized. Conjunctivae were pale. Sclerae non icteric. Ears, nose and throat were normal.  Tongue was papillated. NECK:  Supple. Thyroid normal.  No bruits heard over the carotid arteries. No jugular venous distention was demonstrated. Trachea was midline. NODES:  None felt. No cervical, supraclavicular, axillary, inguinal or epitrochlear adenopathy. BACK:  No tenderness over the cervical or thoracic spine. CHEST:  Breath sounds reduced at both lung bases, some scattered rhonchi at the left base, some scattered rales at the right base. HEART:  Regular tachycardia, without murmurs, rubs, thrills or gallops. ABDOMEN:  Soft, no liver edge, spleen tip, masses or tenderness. No ascites. RECTAL: Deferred. :  Genitalia not examined. SKIN:  Warm and dry without petechiae or purpura. EXTREMITIES:  No edema. NEURO:  Intact. Motor and sensory function normal and equal.      Laboratory:  See above.     Impression:  1. 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 2. Essential hypertension. 3. History of pulmonary emboli with recurrent disease with tumor emboli and tumor thrombus.     Plan:  1. Will schedule an echocardiogram and contrast enhanced CT scan of abdomen and pelvis. 2. I have discussed the situation with Dr. Sae De Jesus and we have switched the patient to Lovenox at 1.5 mg/kg daily to obtain better anticoagulation and the Eliquis has been discontinued. 3. Two week return.   4. Consider a hypercoagulable profile. 5. Begin Sutent 50 mg by mouth, 14 days on, 7 days off, in terms of primary treatment for the metastatic renal cell carcinoma. Another option would be immunotherapy, but that is not improved for first line treatment. 6. The prognosis is guarded here.     C:     SAMANTA Chavez M.D.

## 2017-09-27 PROBLEM — C64.9 KIDNEY CANCER, PRIMARY, WITH METASTASIS FROM KIDNEY TO OTHER SITE (HCC): Status: ACTIVE | Noted: 2017-01-01

## 2017-09-27 NOTE — PROGRESS NOTES
Hematology/Oncology Consult    REASON FOR CONSULT: Stage IV Renal cell carcinoma  REQUESTED BY: Dr. Salvador Long: Mr. Susannah Rodriguez is a 68 y.o. male with hyperlipidemia, hypertension, history of renal stones and actinic keratosis presents for establishing care for known stage IV renal cell carcinoma. He was previously seeing Dr. Dion Godinez with oncology. He states that he gets SOB after a few feet, this has been the case for the last several months, at least since Jan 2017. He is on Lovenox once a day x 1 week. He has not started side effects. Despite his SOB he would push himself to walk 3 miles. He has not noted any bleeding, has noted a decrease in his appetite and lost 5-6 lb in 6 months. He denies any falls, headaches, vision changes, diarrhea, dysuria, abdominal pain, rashes. He does have bouts of cough without expectoration which leads to some chest tightness otherwise no chest pain. He comes with a supportive family-   Brother, spouse, 2 sons. Oncologic history reviewed and summarized below  Initially presented with hematuria in August 2014 at which point a CT of his abdomen and pelvis showed a 4.3 cm right upper kidney mass with IVC and right renal vein extension. Underwent a right radical nephrectomy 9/10/14 -there was concern for intraoperative, tumor spillage. Pathology showed an 8 cm clear cell carcinoma invading the hilum, focally invading the renal vein and the inferior vena cava, grade 3, margins negative, lymphatic invasion was present, one positive lymph node found (pT3N1). He developed pulmonary embolism postoperatively. He was placed on Coumadin for 2 years. Had been on surveillance with negative scans until he presented in May 2017 with shortness of breath. CT angiogram at that time showed extensive bilateral pulmonary emboli with right upper lobe nodular opacities. He was started on apixaban but the shortness of breath persisted and  repeat CT angiogram on 9/6/17 showed extensive pulmonary emboli, without any improvement concerning for tumor emboli. A PET scan was obtained on 9/15/17 which demonstrated increased metabolic activity in bilateral metastatic pulmonary emboli. A 1 cm nodule in the right lower lobe was seen without any uptake. MRI brain was unremarkable. CBC was unremarkable, creatinine 1.2,LDH elevated at 278. Past Medical History:   Diagnosis Date    Actinic keratosis     Calculus of kidney     Chronic pain     Affecting lower back,Grade 3L5/S1 spondylisthesis    Hypercholesterolemia     Palpitations     PE (pulmonary embolism) 09/01/2014    Post right nephrectomy    Renal carcinoma ([facility] [address]     Dx'd inSept.2014, S/P surgical resection       Past Surgical History:   Procedure Laterality Date    HX APPENDECTOMY      HX RENAL BIOPSY         No Known Allergies    Current Outpatient Prescriptions   Medication Sig Dispense Refill    enoxaparin (LOVENOX) 80 mg/0.8 mL injection 80 mg by SubCUTAneous route daily. Indications: PULMONARY THROMBOEMBOLISM PREVENTION, Failed oral anticoagulation 30 Syringe 11    ANTIOX#10/OM3/DHA/EPA/LUT/ZEAX (I-CAPS PO) Take  by mouth.          Social History     Social History    Marital status:      Spouse name: N/A    Number of children: N/A    Years of education: N/A     Social History Main Topics    Smoking status: Former Smoker    Smokeless tobacco: Never Used      Comment: 1 cigar a day    Alcohol use Yes      Comment: occassionally    Drug use: No    Sexual activity: Not on file     Other Topics Concern     Service No    Blood Transfusions No    Caffeine Concern No    Occupational Exposure No    Hobby Hazards No    Sleep Concern No    Stress Concern No    Weight Concern No    Special Diet No    Back Care Yes    Exercise Yes     normal daily activity     Bike Helmet Yes    Seat Belt Yes    Self-Exams Yes     Social History Narrative       Family History   Problem Relation Age of Onset    No Known Problems Mother     Cancer Brother      facial melanoma 2016       ROS  A 12 point review of systems was obtained and is negative except as listed in HPI. ECOG PS is 1  Emotional well being addressed and patient is coping well    Physical Examination:   Visit Vitals    /81    Pulse 86    Temp 99 °F (37.2 °C)    Resp 20    Ht 5' 4\" (1.626 m)    Wt 121 lb 6.4 oz (55.1 kg)    SpO2 97%    BMI 20.84 kg/m2     General appearance - alert, well appearing, and in no distress  Mental status - oriented to person, place, and time  Mouth - mucous membranes moist, pharynx normal without lesions  Neck - supple, no significant adenopathy  Lymphatics - no palpable lymphadenopathy, no hepatosplenomegaly  Chest - clear to auscultation, no wheezes, rales or rhonchi, symmetric air entry  Heart - normal rate, regular rhythm, normal S1, S2, no murmurs, rubs, clicks or gallops  Abdomen - soft, nontender, nondistended, no masses or organomegaly, bowel sounds present  Back exam - full range of motion, no tenderness, palpable spasm or pain on motion  Neurological - normal speech, no focal findings or movement disorder noted  Musculoskeletal - no joint tenderness, deformity or swelling  Extremities - peripheral pulses normal, no pedal edema, no clubbing or cyanosis  Skin - normal coloration and turgor, no rashes, no suspicious skin lesions noted    LABS  Lab Results   Component Value Date/Time    WBC 11.1 09/18/2017 10:45 AM    HGB 11.2 09/18/2017 10:45 AM    HCT 35.4 09/18/2017 10:45 AM    PLATELET 601 22/72/7117 10:45 AM    MCV 88.3 09/18/2017 10:45 AM    ABS.  NEUTROPHILS 7.3 09/18/2017 10:45 AM     Lab Results   Component Value Date/Time    Sodium 140 09/18/2017 10:45 AM    Potassium 5.6 09/18/2017 10:45 AM    Chloride 106 09/18/2017 10:45 AM    CO2 26 09/18/2017 10:45 AM    Glucose 120 09/18/2017 10:45 AM    BUN 26 09/18/2017 10:45 AM    Creatinine 1.29 09/18/2017 10:45 AM    GFR est AA >60 09/18/2017 10:45 AM    GFR est non-AA 54 09/18/2017 10:45 AM    Calcium 9.1 09/18/2017 10:45 AM     Lab Results   Component Value Date/Time    AST (SGOT) 21 09/18/2017 10:45 AM    Alk. phosphatase 99 09/18/2017 10:45 AM    Protein, total 6.6 09/18/2017 10:45 AM    Albumin 2.8 09/18/2017 10:45 AM    Globulin 3.8 09/18/2017 10:45 AM    A-G Ratio 0.7 09/18/2017 10:45 AM       PATHOLOGY  Reviewed under oncologic history      CTA chest 9/6/17  1. Extensive pulmonary emboli bilaterally which have not changed significantly  since the 5/24/2017 exam. Given the lack of change over time, the presence of  tumor emboli must be considered. 2. Enlarging bilateral pulmonary nodules favoring metastatic disease. 3. Right lung airspace opacities either representing pulmonary infarcts or  infectious foci    IMAGING  PET/CT 9/15/17  IMPRESSION  IMPRESSION:   1. There are bilateral metastatic pulmonary emboli to the right pulmonary  artery, right upper lobe and right lower lobe pulmonary arteries and left lower  lobe pulmonary arteries demonstrating increased metabolic activity. 2. There is a 1 cm nodule superior segment right lower lobe which does not  demonstrate increased metabolic activity could be inflammatory/infectious or  possibly metastatic. Uma Daily 23X result called to the  at the office.         ASSESSMENT  Mr. Jory Magana is a 68 y.o. 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 PLAN    Stage IV renal cell carcinoma T3 N1 M1-   Reviewed prior CTA, PET CT results. Reviewed pathology from initial surgery  He does have bilateral pulmonary nodules which are suggestive of metastatic disease   Discussed that although tumor embolism is not common, kidney cancer would be the most common type of malignancy that is associated with this phenomenon. I will review his scans and our cancer conference next week.     Given his initial high risk disease, imagings findings, progressive shortness of breath I do believe he has stage IV renal cell carcinoma. Presence of tumor embolism is usually a bad prognostic indicator, despite the fact that his labs would indicate that he has favorable risk profile per the Kris -criteria median overall survival 37 months(Kris, JCO 2009)    Discussed that stage IV kidney cancer is incurable. Management is palliative with the intent of controlling the disease for as long as possible and maintaining a decent quality of life. Discussed palliative treatments with tyrosine kinase inhibitors. The Sutent is a reasonable choice based on progression free survival benefit, I tend to use Votrient more often due to comparable efficacy and better tolerability in most patients-specifically less mouth sores, less fatigue,less HFS ( Aldo et al. Skylar Loop J Med. 2013)    Reviewed the side effects of Votrient such as-diarrhea, fatigue, elevated blood pressure, loss of protein in urine, thyroid dysfunction, liver dysfunction, risk of bleeding as well as clotting     From combination immunotherapy with Nivolumab and ipilimumab compared to TKIs that was presented and ESMO- results show promising improvement and response rate and progression free survival for intermediate risk patients. This may be an option as well if approved by his insurance    Referred to Dr. Shaina Martinez at [facility] for a second opinion per patient's request in the meantime we will proceed with obtaining approval for Votrient. I will probably start him on 600 mg daily and titrate up as tolerated. Pulmonary tumor emboli  Generally an ominous manifestation. Anticoagulation is not thought to be helpful. We talked about tumor directed therapy.   However I will not hold off on anticoagulation until he has seen Dr. Shaina Martinez and will continue with therapeutic Lovenox     CKD:   Last known creatinine 1.29 we will continue to monitor       He will return to see me after his appointment at [facility] [place]. Omid Yoon MD    Mr. Ankit Vazquez has a reminder for a \"due or due soon\" health maintenance. I have asked that he contact his primary care provider for follow-up on this health maintenance.

## 2017-09-28 NOTE — PROGRESS NOTES
ONCOLOGY NURSE NAVIGATOR    Appreciate referral from Dr. Oliva Deleon to see patient and family immediately following Mr. Kia Tran  He established care with Dr. Oliva Deleon today -- referred by PCP, Dr. Ryder Serna, as his oncologist, Dr. Hieu Renteria, is leaving [facility]oor was dx with [address] in 2014, now has evidence of metastatic disease. Dr. Oliva Deleon discussed palliative treatment today -- with Votrient -- with Lewie Cowden, his 2 sons, wife and his brother. He's requested a second opinion at [facility] and Dr. Oliva Deleon recommends referral to Dr. Shekhar Man. Concurrently, her office will proceed with obtaining approval for Votrient. Chemo teaching was done and consent was obtained today. Focus of my visit today was on their specific concerns about an expedited appt at [facility]. I met with Lewie Cowden and his wife and brother. Adolfo's brother, Corey Nageotte, has been in touch with [employer] and has submitted written request for medical records from Dr. Hieu Renteria. The [facility] are unclear as to where Lewie Cowden stands with getting an appt. I will clarify with the  and act as point person to gather records needed for appt. They are understandably anxious to get Dr. Silvia Kirkpatrick opinion so Lewie Cowden can start treatment. Lewie Cowden, his wife, Roberto Carlos Vega, and Corey Nageotte all know how to reach me. Will work with dr. [place] office on his behalf and assess for barriers to care ongoing. Distance from home to [facility] is noted although they deny transportation barriers.     Rhae Flow MS RN AOCNS

## 2017-10-02 NOTE — PROGRESS NOTES
ONCOLOGY NURSE NAVIGATOR    Phone call to Bill Garcia, scheduling  at [facility] ([phone], ext [phone]) to follow up on Dr. Lois Tolbert referral to Dr. Wilmer rGaves.   Left  requesting return call to discuss if and how I can help expedite Mr. Mari Villanueva MS RN AOCNS

## 2017-10-02 NOTE — TELEPHONE ENCOUNTER
Eulalio Massed    Dear Mr. Vicky Bates and Family,    Interesting that we are both thinking about this even before weve been in touch today! I placed a call to Ismael Louis - and left her a voice mail -- before I saw your message today. I have yet to hear back from her. I think this is VERY good news that all records are in receipt at [facility]. Let me try tomorrow to quickly find who you are scheduled to see at [facility] - if its not Dr. Aurora Martin, let me discuss this with Dr. Heather Garza tomorrow. However, its likely you may hear something before I do. If you learn details about your appointment before you hear from me, please let me know. Do you have access to your My Chart portal?  If possible, lets communicate via your portal as we move forward. This will ensure I receive and can respond to your messages more quickly and confidentially. Lets plan to touch base tomorrow! Have a good evening. Uziel Conklin MS, RN, [place] [address]. [address] [place] [address]  Email:  [email or web address]     Office:  [phone]  Fax:       [phone]  Good Help to Those in [address] [place] [address]    This email transmission is intended to be confidential to the individual(s) and/or entity to who it is addressed. If may contain information of a privileged and/or confidential nature which is subject to the agent of the intended recipient. If you are unable to deliver this communication to the intended recipient, do not read, copy or use the information contained within this transmission or allow it to be read, copied or utilized in any manner by any other person(s). Should this transmission be received in error, please notify the above named sender immediately by telephone. From: [email or web address] [facility] Caren Skelton  Sent: 2017 12:29 PM  To: Thierry Louis  Cc: Lakshmi Carnes; Noa Brasher  Subject: #ExtMail# Fwd: Chitra Rust Yung/appointment coordination with [facility],    My brother, Noa Brasher ( [birth year]), his wife, and I met with you following his appointment with Dr. Shante Santa on . At that time, I mentioned that we had begun the process of scheduling an appointment at [facility], and I gave you a copy of a records release form (which I previously had faxed to Dr. Izabela Abreu) with contact information for Nela Ruiz at [facility]. During the appointment with Dr. Bijan Li told her about our preliminary contact with [facility], and Dr. Shante Santa said she knew some of the oncologists there and would work to schedule an appointment with one of the doctors she knew. Since I had already begun the process with [facility], I asked if you would contact Naila Morrison to coordinate her efforts to schedule an appointment along with Dr. Lesvia Pearce to do the same. Below is message I received today from Naila Morrison, stating that [facility] has received [facility] medical records, is reviewing them, and may schedule the appointment by tomorrow. However, I don't know if Naila Morrison is aware that Dr. Shante Santa is also working to arrange an appointment. Obviously, we'd rather have an appointment with someone whom Dr. Shante Santa recommends. However, if that person is unavailable, we don't want to derail Melany's efforts. Do you know if the status of Dr. Berumen Schools contact with [facility] and has that effort been coordinated with Naila Morrison as we discussed last week? Thanks for your help,  Joe Barnes  [phone]        ---------- Forwarded message ----------  From: [email or web address]  Date: Mon, Oct 2, 2017 at 10:59 AM  Subject: RE: Standing Authorization for Noa Brasher  To: [email or web address]      Good Morning  Caren Nafisa,    I hope all is well! Thank you for your email.  Currently, your records are being reviewed by the clinic and should be scheduled by tomorrow. Thank you,    Vielka Norwood  [address] [facility]  Office: [phone]  Fax: [phone]  Email: [email or web address]  Web site: [employer]    WARNING:  E-mail sent over the Internet is not secure. Information sent by e-mail may not remain confidential.    DISCLAIMER:  This e-mail is intended only for the individual to whom it is addressed. It may be used only in accordance with applicable laws. If you received this e-mail by mistake, notify the sender and destroy the e-mail.    -----Original Message-----  From: [email or web address]  Sent: Saturday, September 30, 2017 12:15 PM  To: [email or web address]  Subject: Standing Authorization for Shaggy Henderson,    Attached is the completed Standing Authorization to [facility] Silvia Hubbard for my brother. Deb Headings    -----Original Message-----  From: Boom Snyder  Sent: Monday, September 25, 2017 4:23 PM  To: Ravin Frank  Subject: RE: [address] Afternoon Mr. Venecia Garcia,    Thank you for your email! Yes please, you may enter [facility] in the to space and my fax number ([phone]). Thank you,    Vielka Norwood  [address] [place]  Office: [phone]  Fax: [phone]  Email: [email or web address]  Web site: [employer]    WARNING:  E-mail sent over the Internet is not secure. Information sent by  e-mail may not remain confidential.    DISCLAIMER:  This e-mail is intended only for the individual to whom it is  addressed. It may be used only in accordance with applicable laws.   If you  received this e-mail by mistake, notify the sender and destroy the e-mail.      -----Original Message-----  From: [email or web address]  Sent: Monday, September 25, 2017 10:43 AM  To: [email or web address]  Subject: Re: Albina Dunn,    Thank you for helping me with the appointment. I have a question about the  form titled \"Standing Authorization to [address] with  Designated Persons. \" It appears that this form is for [facility]' use. Can I enter \"[facility]\" as the \"name of the health care  provider\"? Conrado Epstein    -----Original Message-----  From: Hieu Yoli  Sent: Monday, September 25, 2017 8:38 AM  To: [email or web address]  Subject: [address]    Dear Prospective Patient:    Thank you for selecting [facility] for your medical needs. I am delighted to help you receive the best possible care at one of the top  hospitals in the according to [address]. To request an appointment, please follow the steps below. As soon as the  requested information is received, I will forward your information to the  appropriate department for review. Upon completion, I will contact you with  the results of the departments recommendation. STEP 1: Complete Appointment Request Form: Please complete all information  requested in the attached Appointment Request Form and return it via fax or  e-mail. Please be sure to list the condition for which you wish to be seen  and your expectations in coming to [facility]. I also ask that you  include an enlarged copy of your medical insurance card (front and back). STEP 2:  Send Patients Medical Reports: Please send via e-mail, fax or   the most recent medical reports in [address] [place] [address] with the above completed  Appointment Request Form. Also include the patients diagnosis, written  reports of diagnostic, radiological and pathology studies, and a description  of current treatment.   All pertinent films should be hand-carried to the  appointment. STEP 3:  Send all information to my attention:    [address] [facility]  [address]  On license of [facility], [address]  Office: [phone] [facility] [address]  Fax: [phone]  E-mail:  [email or web address]      Please be assured that [facility] is committed to protecting your  clinical and financial information. I hope the above step-by-step process is helpful. Please do not hesitate to  contact us if we can be of further assistance.     Sincerely,      [facility]

## 2017-10-03 PROBLEM — C64.9 KIDNEY CANCER, PRIMARY, WITH METASTASIS FROM KIDNEY TO OTHER SITE (HCC): Status: RESOLVED | Noted: 2017-01-01 | Resolved: 2017-01-01

## 2017-10-03 PROBLEM — C64.9 PRIMARY MALIGNANT NEOPLASM OF KIDNEY WITH METASTASIS FROM KIDNEY TO OTHER SITE (HCC): Status: ACTIVE | Noted: 2017-01-01

## 2017-10-03 NOTE — TELEPHONE ENCOUNTER
ONCOLOGY NURSE NAVIGATOR    Phoned Mr. Meme Ayers to discuss the emails I received from his brother, Jos Harris -- they were sent last night and are attached below. Advised Mr. Meme Ayers that I have informed Dr. Shalonda Aguirre that Dr. Sergio Malave is no longer at [facility] -- he is now at [facility]. Dr. Shalonda Aguirre is aware that [facility] has offered Mr. Meme Ayers an appt with  oncology expert Dr. Nela Bob on October 17. She wants Mr. Meme Ayers to know that she believes Dr. Katerina Ring will provide him with a quality opinion. Mr. Meme Ayers is satisfied that Dr. Shalonda Aguirre is aware. He is interested in receiving an expert opinion and learning about options for clinical trials and he plans to accept the appt with Dr. Katerina Ring. He will discuss this with his wife, Dahlia, and brother, Jos Harris. He knows Dr. Kerry Hyman staff and I are available to him for support as needed. He has my phone number. Advised Mr. Meme Ayers that he may receive phone call from speciality pharmacy called [facility] about Votrient -- he can work with pharmacy to arrange shipment based on his treatment decisions following his appt with Dr. Katerina Ring. Verbalized understanding and appreciation for the call. Michelle Guzmán MS RN AOCNS        Emails rec'ed on 10/2:    Cipriano Tristan,     An update to my prior message of a couple of hours ago: Sulema Truong received a call about 6:30 this evening from someone named Emanuel Zarate at [facility], who said they've scheduled an appointment with Dr. Katerina Ring on October 17 at 8:00 a.m. Emanuel Zarate said she would send an email with further details, but so far that hasn't been received. (This is probably whom the appointment is with: [email or web address]     Having briefly reviewed the online bios of Dr. Katerina iRng and Dr. Sergio Malave, our preference would be an appoinment with Dr. Sergio Malave if Dr. Shalonda Aguirre can arrange it. Please let us know how to proceed. Thanks!   Jos Harris    Another update: if this is the Atkinson Overlie you're referring to ([email or web address]), it appears he left [employer] few months ago and now works at [employer] in [place]. I couldn't find a doctor named \"Keanu Luna\" using the [employer] search function. Even though he isn't a [employer] and is in [place], we'd still rather have an appointment with Dr. Ethan Downey if he could see Froilan Clemonsper within a reasonable period of time.      Ankur Jonas

## 2017-10-03 NOTE — PROGRESS NOTES
Chief Complaint   Patient presents with    Renal Cell Carcinoma         HPI:       is a 68 y.o. male. History of PE, tumor emboli and metastatic clear renal cell carcinoma. Recently seen in [facility] with Dr Shalonda Aguirre and is scheduled to see Dr Katerina Ring at [facility] Oct 17, 2017 for another opinion. Walks 3 miles a day and is still fishing. Feels no different on Lovenox and he would like to stop and resume Apixaban. No Known Allergies    Current Outpatient Prescriptions   Medication Sig    apixaban (ELIQUIS) 5 mg tablet Take 1 Tab by mouth two (2) times a day. Indications: possible lung blood clot    ANTIOX#10/OM3/DHA/EPA/LUT/ZEAX (I-CAPS PO) Take  by mouth.  PAZOPanib (VOTRIENT) 200 mg tablet Take 4 Tabs by mouth daily. Indications: renal cell carcinoma     No current facility-administered medications for this visit. Past Medical History:   Diagnosis Date    Actinic keratosis     Calculus of kidney     Chronic pain     Affecting lower back,Grade 3L5/S1 spondylisthesis    Hypercholesterolemia     Palpitations     PE (pulmonary embolism) 09/01/2014    Post right nephrectomy    Renal carcinoma ([facility] [address]     Dx'd inSept.2014, S/P surgical resection         ROS:  Denies fever, chills, cough, chest pain, SOB,  nausea, vomiting, or diarrhea. Denies wt loss, wt gain, hemoptysis, hematochezia or melena. Physical Examination:    /80 (BP 1 Location: Right arm, BP Patient Position: Sitting)  Pulse 88  Temp 96.4 °F (35.8 °C) (Oral)   Resp 16  Wt 122 lb (55.3 kg)  SpO2 97%  BMI 20.94 kg/m2    General: Alert and Ox3, Fluent speech  HEENT:  NC/AT, EOMI, OP: clear  Neck:  Supple, no adenopathy, JVD, mass or bruit  Chest:  Clear to Ausculation, without wheezes, rales, rubs or ronchi  Cardiac: RRR, loud P2  Abdomen:  +BS, soft, nontender without palpable HSM  Extremities:  No cyanosis, clubbing or edema  Neurologic:  Ambulatory without assist, CN 2-12 grossly intact.   Moves all extremities. Skin: no rash  Lymphadenopathy: no cervical or supraclavicular nodes      ASSESSMENT AND PLAN:     1. Metastatic clear renal cell carcinoma: Will regroup here after his appt at [facility]  2. Anticoagulation:  Will stop the Lovenox and resume the Apixaban. 3.  He has already had his flu vaccine  4. Well controlled HTN    Orders Placed This Encounter    apixaban (ELIQUIS) 5 mg tablet     Sig: Take 1 Tab by mouth two (2) times a day.  Indications: possible lung blood clot     Dispense:  180 Tab     Refill:  4       William Corona MD, [address]

## 2017-10-03 NOTE — PROGRESS NOTES
Call to [address] [place] [address] spoke with Lety/pharmacist.  They are to process votrient as written. Pertinent information provided. Will follow. [facility] consent and teaching was provided by myself on 9/27/17. Chemo care information provided.

## 2017-10-04 NOTE — TELEPHONE ENCOUNTER
Vesna Hartmann,     I talked with Antonia Perez about this yesterday. I advised him that he may receive a phone call from [address] about [address] He can work with pharmacy to arrange shipment based on his treatment decisions following his appointment at [facility] with Dr. Damir Gar. Please call me if you have any questions. Thanks. Ese Conklin, MS, RN, [facility] 07. [address], [address] [place] [address]  Email:  [email or web address]     Office:  [phone]  Fax:       [phone]  Good Help to Those in [address] [place] [address]    This email transmission is intended to be confidential to the individual(s) and/or entity to who it is addressed. If may contain information of a privileged and/or confidential nature which is subject to the agent of the intended recipient. If you are unable to deliver this communication to the intended recipient, do not read, copy or use the information contained within this transmission or allow it to be read, copied or utilized in any manner by any other person(s). Should this transmission be received in error, please notify the above named sender immediately by telephone. From: [email or web address] [[email or web address]   Sent: Wednesday, October 04, 2017 3:01 PM  To: Carmen MASTERS  Subject: RE: #ExtMail# Fwd: Antonia Perez Ball/appointment coordination with [facility] (safe mail)      Jyoti Carey,     Another question: Antonia Perez received a call today that the Votrient medication is ready for pickup. Should he go forward with delivery and begin the medication OR wait until after the October 17 appointment with Dr. Damir Gar?      Harleen Curtis

## 2017-10-04 NOTE — TELEPHONE ENCOUNTER
Thanks so much for taking care of this! Yes, I can certainly come by Dr. Darcy Krishnamurthy office to  the disk. Would next Tuesday, Oct. 17 be okay? Will it be at the reception desk? His appointment with Dr. Amadeo Castañeda is Oct. 17 at 8:00 a.m. Jyoti Esmebrenda  --- Originally sent by [email or web address] on Oct 4, 2017 10:04 AM ---     Sonali Logan morning! Yes, I can request that all images are copied onto CD. Ill have the CD available for  at Dr. Reyna Bedolla office. You can come at your convenience to  - is next week okay? I believe Adolfos appt at [facility] is on October 18? Katiuska Regalado MS, RN, [facility] 76. [address]  Email:  [email or web address]     Office:  [phone]  Fax:       [phone]  Good Help to Those in [address] [place] [address]     This email transmission is intended to be confidential to the individual(s) and/or entity to who it is addressed. If may contain information of a privileged and/or confidential nature which is subject to the agent of the intended recipient. If you are unable to deliver this communication to the intended recipient, do not read, copy or use the information contained within this transmission or allow it to be read, copied or utilized in any manner by any other person(s). Should this transmission be received in error, please notify the above named sender immediately by telephone. From: [email or web address] [[email or web address]]  Sent: Tuesday, October 03, 2017 8:13 PM  To: Teri Marquez  Subject: RE: #ExtMail# Fwd: Thierry Abreu Ball/appointment coordination with [facility] (safe mail)        Edwina Bowen,     I talked with Thierry Abreu this evening. [facility] has asked for a DVD/CD copy of the CT and PET scans that were performed at 8 a.m. on September 15 at [facility]. Thierry Abreu has asked me to obtain them since I live in the [facility]. Are you able to get the scans on a disk? If so, I'll come by your office (or wherever to specify) to get it. [facility] told us to bring the disk to the appointment. Thierry Abreu says he signed an Sin Murillo authorization that allows me access his medical information.      Thanks for your help,  Jyoti Yee

## 2017-10-18 NOTE — TELEPHONE ENCOUNTER
Call and spoke with patient wife. HIPAA verified. Advised to schedule votrient delivery and start when received. He will advise our office day of start. We will then schedule appt/labs for 2 weeks to discuss. Advised to keep a Blood Pressure log BID until appointment. Verbalized understanding. Encouraged to call with any additional questions/concerns. Appreciative of everyone's assistance.

## 2017-10-18 NOTE — TELEPHONE ENCOUNTER
ONCOLOGY NURSE NAVIGATOR    Rec'ed call from Mr. Malgorzata Sanz. He was seen yesterday at [facility] by Dr. Josefina Pinto. Mr. Malgorzata Sanz states Dr. Deshawn Craft agrees with Dr. Shelby Miranda dx and treatment plan. Mr. Malgorzata Sanz is home and calling to discuss next steps. He has contact info for [address] who is holding his Votrient. Does he need appt with Dr. Jenni Sampson before starting Votrient --  or, when? He expects a call with direction.     Luan Woods MS RN [facility]OC[facility]

## 2017-10-23 NOTE — TELEPHONE ENCOUNTER
HIPAA verified. Advised pre provider note to be seen 2 weeks after starting votrient. Stated started med 10/21/17. Will need appt with Dr. Ramos Dempsey around 11/2 or 11/3 and have labs ordered at that time. Patient stated BP values:  167/97 and 165/105 after starting med. Advised BP should remain around 120/80 and would forward to provider. Patient verbalized understanding and thanked for call.

## 2017-10-23 NOTE — TELEPHONE ENCOUNTER
Patient called requesting a return call from Ti. Patient stated he was told to call after he started the chemo pills to schedule labs (?). Please return patient's call to discuss [phone].   beryl

## 2017-10-23 NOTE — TELEPHONE ENCOUNTER
ML to return call. Per most recent note patient is to have office visit and labs 2 weeks after starting votrient and to keep BP log while on med.

## 2017-10-25 NOTE — TELEPHONE ENCOUNTER
Call to check on patient. Day 3 of votrient. HIPAA verified. \"No side effects of mediciation to speak of.\". Fatigue yesterday but feels better today. BP has improved slightly 140/90 this AM.  Continues to hydrate and states he is urinating sufficiently. He does go to his PCP Tuesday 10/31 and RTC here with labs 11/3.

## 2017-10-31 NOTE — Clinical Note
BP is up. I started AMlodipine 5 mg HS--did not see any contraindication. Likely from the [address]

## 2017-10-31 NOTE — PROGRESS NOTES
Chief Complaint   Patient presents with    Blood Pressure Check     checked twice          HPI:       is a 68 y.o. male. Metastatic renal cell carcinoma. Presently on Votrient. BP is a well documented side effect and his BP is up. Occasional headaches and fatigue. Cough and post nasal drip are also problematic. Not motivated to fish. Followed by Dr Rosario Lopez. Seen at [facility] for a second opinion. Nothing new advised. No Known Allergies    Current Outpatient Prescriptions   Medication Sig    amLODIPine (NORVASC) 5 mg tablet Take 1 Tab by mouth daily.  apixaban (ELIQUIS) 5 mg tablet Take 1 Tab by mouth two (2) times a day. Indications: possible lung blood clot    PAZOPanib (VOTRIENT) 200 mg tablet Take 4 Tabs by mouth daily. Indications: renal cell carcinoma    ANTIOX#10/OM3/DHA/EPA/LUT/ZEAX (I-CAPS PO) Take  by mouth. No current facility-administered medications for this visit. Past Medical History:   Diagnosis Date    Actinic keratosis     Calculus of kidney     Chronic pain     Affecting lower back,Grade 3L5/S1 spondylisthesis    Hypercholesterolemia     Palpitations     PE (pulmonary embolism) 09/01/2014    Post right nephrectomy    Renal carcinoma ([facility] [address]     Dx'd inSept.2014, S/P surgical resection         ROS:  Denies fever, chills, cough, chest pain, SOB,  nausea, vomiting, or diarrhea. Denies wt loss, wt gain, hemoptysis, hematochezia or melena. Physical Examination:    BP (!) 164/100  Pulse 90  Temp 97.5 °F (36.4 °C) (Temporal)   Ht 5' 4\" (1.626 m)  Wt 124 lb (56.2 kg)  SpO2 95%  BMI 21.28 kg/m2    General: Alert and Ox3, Fluent speech  HEENT:  NC/AT, EOMI, OP: clear  Neck:  Supple, no adenopathy, JVD, mass or bruit  Chest:  Clear to Ausculation, without wheezes, rales, rubs or ronchi  Cardiac: RRR  Abdomen:  +BS, soft, nontender without palpable HSM  Extremities:  No cyanosis, clubbing or edema  Neurologic:  Ambulatory without assist, CN 2-12 grossly intact. Moves all extremities. Skin: no rash  Lymphadenopathy: no cervical or supraclavicular nodes      ASSESSMENT AND PLAN:     1. Metastatic renal cell carcinoma:  Slowly worsening\  2. HTN as a result of Votrient:  Start Amlodipine 5 mg HS now  3. RTC in a month  4. End of life issues reviewed with Mr Arti Sainz today. Orders Placed This Encounter    amLODIPine (NORVASC) 5 mg tablet     Sig: Take 1 Tab by mouth daily.      Dispense:  90 Tab     Refill:  4       Andrez Zimmer MD, [address]

## 2017-10-31 NOTE — MR AVS SNAPSHOT
Visit Information Date & Time Provider Department Dept. Phone Encounter #  
 10/31/2017  2:15 PM Sheridan Rouse MD Cindy Harvey [id number] Your Appointments 11/3/2017  9:30 AM  
Follow Up with Nely Pham MD  
[facility] SHERI Oncology at [facility] DAVIE) Appt Note: 2wk fu  
 [address] [address]  
[phone]  
  
   
 [address] [address] [facility] [address] Upcoming Health Maintenance Date Due  
 MEDICARE YEARLY EXAM 6/30/2018 GLAUCOMA SCREENING Q2Y 6/2/2019 DTaP/Tdap/Td series (2 - Td) 10/7/2025 Allergies as of 10/31/2017  Review Complete On: 10/31/2017 By: Guille Durham LPN No Known Allergies Current Immunizations  Reviewed on 9/7/2017 Name Date Influenza High Dose Vaccine PF 9/1/2017  1:37 PM  
 Influenza Vaccine 11/4/2016, 10/7/2015 12:17 PM  
 Pneumococcal Conjugate (PCV-13) 6/29/2017  4:05 PM  
 Pneumococcal Polysaccharide (PPSV-23) 11/23/2010, 10/28/2005 Tdap 10/7/2015 12:19 PM  
 Zoster Vaccine, Live 4/4/2011 Not reviewed this visit Vitals BP Pulse Temp Height(growth percentile) Weight(growth percentile) SpO2  
 (!) 164/100 90 97.5 °F (36.4 °C) (Temporal) 5' 4\" (1.626 m) 124 lb (56.2 kg) 95% BMI Smoking Status 21.28 kg/m2 Former Smoker Vitals History BMI and BSA Data Body Mass Index Body Surface Area  
 21.28 kg/m 2 1.59 m 2 Preferred Pharmacy Pharmacy Name Phone [address], [address] [facility] [phone] Your Updated Medication List  
  
   
This list is accurate as of: 10/31/17  3:19 PM.  Always use your most recent med list.  
  
  
  
  
 apixaban 5 mg tablet Commonly known as:  Dashawn Saad Take 1 Tab by mouth two (2) times a day. Indications: possible lung blood clot I-CAPS PO Take  by mouth. PAZOPanib 200 mg tablet Commonly known as:  Asif Piety Take 4 Tabs by mouth daily. Indications: renal cell carcinoma To-Do List   
 11/03/2017 9:00 AM  
  Appointment with Joce Nash [facility] ([phone]) Introducing [facility]! Dear Brandie Welch: Thank you for requesting a IID account. Our records indicate that you already have an active IID account. You can access your account anytime at [email or web address] Did you know that you can access your hospital and ER discharge instructions at any time in IID? You can also review all of your test results from your hospital stay or ER visit. Additional Information If you have questions, please visit the Frequently Asked Questions section of the IID website at [email or web address] Remember, IID is NOT to be used for urgent needs. For medical emergencies, dial 911. Now available from your iPhone and Android! Please provide this summary of care documentation to your next provider. Your primary care clinician is listed as Jeannette Denton. If you have any questions after today's visit, please call [phone].

## 2017-11-03 PROBLEM — C64.9: Status: ACTIVE | Noted: 2017-01-01

## 2017-11-03 NOTE — PROGRESS NOTES
Hematology/Oncology follow-up    REASON FOR visit: Stage IV Renal cell carcinoma  REQUESTED BY: Dr. Ralph Whipple: Mr. Robert Kidd is a 68 y.o. male with hyperlipidemia, hypertension, history of renal stones and actinic keratosis with stage IV renal cell carcinoma. Today he presents for follow-up after starting 800 mg of Votrient on 10/18/17    He has had severe mid sternal chest pain - this started yesterday. Deep breath makes it worse. Not bothering him at rest. Doesn't radiate. Exertion definitely makes it worse. Lasts about a minute. He has a dry cough. He has been very tired. He has a mild headache. His SBP was elevated at 170 and he was started on Amlodipine 5 mg 2 days ago a BP today is in the 150s. No significant bleeding and rarely has some blood when he sneezes. No edema. No diarrhea. Oncologic history reviewed and summarized below  Initially presented with hematuria in August 2014 at which point a CT of his abdomen and pelvis showed a 4.3 cm right upper kidney mass with IVC and right renal vein extension. Underwent a right radical nephrectomy 9/10/14 -there was concern for intraoperative, tumor spillage. Pathology showed an 8 cm clear cell carcinoma invading the hilum, focally invading the renal vein and the inferior vena cava, grade 3, margins negative, lymphatic invasion was present, one positive lymph node found (pT3N1). He developed pulmonary embolism postoperatively. He was placed on Coumadin for 2 years. Had been on surveillance with negative scans until he presented in May 2017 with shortness of breath. CT angiogram at that time showed extensive bilateral pulmonary emboli with right upper lobe nodular opacities. He was started on apixaban but the shortness of breath persisted and  repeat CT angiogram on 9/6/17 showed extensive pulmonary emboli, without any improvement concerning for tumor emboli.   A PET scan was obtained on 9/15/17 which demonstrated increased metabolic activity in bilateral metastatic pulmonary emboli. A 1 cm nodule in the right lower lobe was seen without any uptake. MRI brain was unremarkable. CBC was unremarkable, creatinine 1.2,LDH elevated at 278. Obtained a second opinion at [facility] with Dr. Damir Gar. PET scans were reviewed again. Not found to be a candidate for clinical trials. Agreed with proceeding with Votrient    Treatment  10/18/17 Votrient 800 mg daily:     Past Medical History:   Diagnosis Date    Actinic keratosis     Calculus of kidney     Chronic pain     Affecting lower back,Grade 3L5/S1 spondylisthesis    Hypercholesterolemia     Palpitations     PE (pulmonary embolism) 09/01/2014    Post right nephrectomy    Renal carcinoma ([facility] [address]     Dx'd inSept.2014, S/P surgical resection       Past Surgical History:   Procedure Laterality Date    HX APPENDECTOMY      HX RENAL BIOPSY         No Known Allergies    Current Outpatient Prescriptions   Medication Sig Dispense Refill    amLODIPine (NORVASC) 5 mg tablet Take 1 Tab by mouth daily. 90 Tab 4    apixaban (ELIQUIS) 5 mg tablet Take 1 Tab by mouth two (2) times a day. Indications: possible lung blood clot 180 Tab 4    PAZOPanib (VOTRIENT) 200 mg tablet Take 4 Tabs by mouth daily. Indications: renal cell carcinoma 120 Tab 0    ANTIOX#10/OM3/DHA/EPA/LUT/ZEAX (I-CAPS PO) Take  by mouth.          Social History     Social History    Marital status:      Spouse name: N/A    Number of children: N/A    Years of education: N/A     Social History Main Topics    Smoking status: Former Smoker    Smokeless tobacco: Never Used      Comment: 1 cigar a day    Alcohol use Yes      Comment: occassionally    Drug use: No    Sexual activity: Not Asked     Other Topics Concern     Service No    Blood Transfusions No    Caffeine Concern No    Occupational Exposure No    Hobby Hazards No    Sleep Concern No    Stress Concern No    Weight Concern No    Special Diet No    Back Care Yes    Exercise Yes     normal daily activity     Bike Helmet Yes    Seat Belt Yes    Self-Exams Yes     Social History Narrative       Family History   Problem Relation Age of Onset    No Known Problems Mother     Cancer Brother      facial melanoma 2016       ROS  A  review of systems was obtained and is negative except as listed in HPI. ECOG PS is 1  Emotional well being addressed and patient is coping well    Physical Examination:   Visit Vitals    /87    Pulse 85    Temp 97.1 °F (36.2 °C)    Resp 20    Ht 5' 4\" (1.626 m)    Wt 122 lb 14.4 oz (55.7 kg)    SpO2 98%    BMI 21.1 kg/m2     General appearance - alert, well appearing, and in no distress  Mental status - oriented to person, place, and time  Mouth - mucous membranes moist, pharynx normal without lesions  Neck - supple, no significant adenopathy  Lymphatics - no palpable lymphadenopathy, no hepatosplenomegaly  Chest - clear to auscultation, no wheezes, rales or rhonchi, symmetric air entry  Heart - normal rate, regular rhythm, normal S1, S2, no murmurs, rubs, clicks or gallops  Abdomen - soft, nontender, nondistended, no masses or organomegaly, bowel sounds present  Back exam - full range of motion, no tenderness, palpable spasm or pain on motion  Neurological - normal speech, no focal findings or movement disorder noted  Musculoskeletal - no joint tenderness, deformity or swelling  Extremities - peripheral pulses normal, no pedal edema, no clubbing or cyanosis  Skin - normal coloration and turgor, no rashes, no suspicious skin lesions noted    LABS  Lab Results   Component Value Date/Time    WBC 9.3 11/03/2017 09:21 AM    HGB 13.9 11/03/2017 09:21 AM    HCT 43.2 11/03/2017 09:21 AM    PLATELET 177 29/15/8297 09:21 AM    MCV 85.7 11/03/2017 09:21 AM    ABS.  NEUTROPHILS 6.4 11/03/2017 09:21 AM     Lab Results   Component Value Date/Time    Sodium 140 09/18/2017 10:45 AM    Potassium 5.6 09/18/2017 10:45 AM Chloride 106 09/18/2017 10:45 AM    CO2 26 09/18/2017 10:45 AM    Glucose 120 09/18/2017 10:45 AM    BUN 26 09/18/2017 10:45 AM    Creatinine 1.29 09/18/2017 10:45 AM    GFR est AA >60 09/18/2017 10:45 AM    GFR est non-AA 54 09/18/2017 10:45 AM    Calcium 9.1 09/18/2017 10:45 AM     Lab Results   Component Value Date/Time    AST (SGOT) 21 09/18/2017 10:45 AM    Alk. phosphatase 99 09/18/2017 10:45 AM    Protein, total 6.6 09/18/2017 10:45 AM    Albumin 2.8 09/18/2017 10:45 AM    Globulin 3.8 09/18/2017 10:45 AM    A-G Ratio 0.7 09/18/2017 10:45 AM       PATHOLOGY  Reviewed under oncologic history      CTA chest 9/6/17  1. Extensive pulmonary emboli bilaterally which have not changed significantly  since the 5/24/2017 exam. Given the lack of change over time, the presence of  tumor emboli must be considered. 2. Enlarging bilateral pulmonary nodules favoring metastatic disease. 3. Right lung airspace opacities either representing pulmonary infarcts or  infectious foci    IMAGING  PET/CT 9/15/17  IMPRESSION  IMPRESSION:   1. There are bilateral metastatic pulmonary emboli to the right pulmonary  artery, right upper lobe and right lower lobe pulmonary arteries and left lower  lobe pulmonary arteries demonstrating increased metabolic activity. 2. There is a 1 cm nodule superior segment right lower lobe which does not  demonstrate increased metabolic activity could be inflammatory/infectious or  possibly metastatic. Sindy Gearing 23X result called to the  at the office.       Reviewed notes from [facility]. PET scan interpretation at [facility] reviewed as well  Second opinion interpretation of outside study dated 9/6/2017. 1.  Large pulmonary emboli bilaterally including the right lobar artery with no evidence of right ventricular strain. Given the PET/CT findings 9/15/2017, this is consistent with tumor emboli.   2.  2.4 cm mass contiguous with the right middle lobe pulmonary artery may BE a large embolus or mass.  3.  Right hilar pulmonary nodule measuring 1.8 cm most likely metastatic disease. 4.  Status post right nephroureterectomy with no evidence of local disease recurrence in the surgical bed. 5.  Nonobstructive 5 mm left renal calculus appears to be within the inferior left renal pelvis. ASSESSMENT  Mr. Jennifer Ignacio is a 68 y.o. 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 PLAN    Stage IV renal cell carcinoma T3 N1 M1-   Reviewed prior CTA, PET CT results. Reviewed notes from [facility] and discussed with the patient. Going forward the right hilar pulmonary nodule measuring 1.8 cm might serve as measurable disease. Started Votrient on 10/18/17 at 800 mg per day and tolerating it well. He does have grade 1 fatigue and grade 2 hypertension. .  Hypertension is better controlled on 5 mg of amlodipine  Continue current dose. Hypertension  Secondary to Votrient. He has been on 5 mg of amlodipine for 3 days now. Systolic blood pressure has decreased from 170-150s  If Blood pressure remains above 150 after a full week of being on the current dose of amlodipine then will increase to 10 mg daily. If systolic blood pressure remains above 150 despite increasing amlodipine to 10 mg daily will dose reduce Votrient to 600 mg. Pleuritic chest pain  This is new. We obtained a stat EKG which was unremarkable   CTA of his chest obtained today as well. Images reduced were reviewed personally    Results were reviewed with the patient over the phone once he had left for the day. He does have increase in his known metastatic disease to the lungs, no new pulmonary emboli. Has a right sided infarct-which likely explains his pleuritic pain. He should get better with time. For now he was given a prescription for Norco which he can take every 4 hours as needed for pain. Pulmonary tumor emboli  Generally an ominous manifestation.   Anticoagulation is not thought to be helpful but some of what we see in his scans may be thrombi. Continue Eliquis for now    CKD:   Last known creatinine 1.34. No dose adjustments required. He will communicate with us via my chart next week regarding his chest pain and his blood pressure. I will see him back in a month with blood work. Marcus Heard MD    Mr. Alice Bahena has a reminder for a \"due or due soon\" health maintenance. I have asked that he contact his primary care provider for follow-up on this health maintenance.

## 2017-11-03 NOTE — PROGRESS NOTES
Ming Mccoy. is a 68 y.o. male here today for follow up, kidney cancer. States feeling fatigued. Reports having increased sharp pain to right side since last evening. States BP reading at home have been elevated, brought in BP machine to have it assessed.

## 2017-11-03 NOTE — Clinical Note
Needs to come back to see me in 4 weeks  Nursing please set up for CBC, CMP, Urine protein  And urine creatinine in [facility] in 4 weeks when he is here to see me.

## 2017-11-03 NOTE — PROGRESS NOTES
Blood pressure (!) 153/93, pulse 88, temperature 98.1 °F (36.7 °C), resp. rate 16. Patient came in for a peripheral lab draw.  Labs were drawn from the right antecubital.  Labs drawn were a CBC w/Diff, CMP, TSH and a Magnesium ordered by Shyla Chavez NP.

## 2017-11-06 NOTE — TELEPHONE ENCOUNTER
Call returned to crystal in radiology, department wanted to verify that provider has received report of CT. Verified receipt of report. Thanked for return call.

## 2017-11-07 NOTE — TELEPHONE ENCOUNTER
Call to check in. Spoke with wife Neftali Do. HIPAA verified. Sleeping now. Fatigue. Pain medication in use with success. Denies actual pain in chest. \"but he knows something is there\"  Nausea for the last few days. Vomit x 1 this morning. He took his oxycodone on an empty stomach. Encouraged to take with food. Challenge is votrient is to be taken on empty stomach. Discussed timing of medications. Suggested prescription anti-nausea but she prefers to wait. BP on 11/5 158/98  11/6 it was 164/105  11/7 it was 167/110    Encouraged to keep us apprised of symptom or if any additional questions/concerns develop. Forward to provider.

## 2017-11-08 NOTE — TELEPHONE ENCOUNTER
Per Dr Shalonda Aguirre. HIPAA verified. Spoke with Zakia Brewer. Pt to increase his amlodipine to 10 mg daily. Continue to monitor blood pressure and she will keep us apprised of progress.

## 2017-11-10 NOTE — TELEPHONE ENCOUNTER
HIPAA verified. Call to patient. Started 10mg amlodipine Wednesday. BP Thursday 164/100  Has not yet taken today. Encouraged to keep us apprised.

## 2017-11-17 NOTE — TELEPHONE ENCOUNTER
HIPAA verified. Advised that V[facility]rient refill [facility] to [facility] and to call for refill. Patient stated may have to transfer to [facility]. Advised to discuss insurance with [facility] and let us know if script needs to be transferred. Verbalized understanding.

## 2017-11-20 NOTE — TELEPHONE ENCOUNTER
Pt called is completely out of medication need a refill today. Pt would like a call back today he states only from Dr. Jose Alfredo York     Requested Prescriptions     Pending Prescriptions Disp Refills    PAZOPanib (VOTRIENT) 200 mg tablet 120 Tab 0     Sig: Take 4 Tabs by mouth daily.  Indications: renal cell carcinoma

## 2017-11-20 NOTE — TELEPHONE ENCOUNTER
Call to patient to verify that votrient in process. HIPAA verified. Patient stated contacted [facility] on Friday and cannot fill med. Patient stated med is to come from [facility] and is in process. Contact # for [facility] is:  [phone]. RN advised would contact [facility] to expedite.

## 2017-11-20 NOTE — TELEPHONE ENCOUNTER
Call to [facility] to check status of votrient/[place]. Stated refill request recd 11/18/17 and is in process. Reviewed that Dr. Latasha Roche is ordering provider. RN advised that patient is out of medication and needs urgent delivery. Agustin Parkinson will expedite delivery. Clinical info and script faxed complete to [facility] @ [phone].

## 2017-11-20 NOTE — TELEPHONE ENCOUNTER
Spoke with Lindsey Obando. Stated [facility] spoke with patient on Friday for refill. Rn advised that script faxed to [address] [place] [address] 11/17/17 with confirmation. Per Lindsey Obando requests re-fax of script. Script re-faxed as requested with confirmation. Specialty pharmacy aware that patient needs med today as per phone conversation with patient 11/17/17.

## 2017-11-20 NOTE — TELEPHONE ENCOUNTER
Call returned to patient. Message left that request for expedited votrient delivery with [facility] in place. Gave office number for contact.

## 2017-11-21 NOTE — TELEPHONE ENCOUNTER
Call to patient. Votirent scheduled delivery for today. Normalized feelings with frustration over obtaining drug from new carrier. [employer] is new carrier. Pt to continue to monitor blood pressure. Sunday 139/88. Slowly dropping utilizing 10 mg amilodopine.

## 2017-11-30 NOTE — PROGRESS NOTES
Hematology/Oncology follow-up    REASON FOR visit: Stage IV Renal cell carcinoma  REQUESTED BY: Dr. Viet Lu: Mr. Moisés An is a 68 y.o. male with hyperlipidemia, hypertension, history of renal stones and actinic keratosis with stage IV renal cell carcinoma. Today he presents for follow-up after starting 800 mg of Votrient on 10/18/17. He has been feeling bad- very tired, has chills, stools are loose, lot of flatulence, food doesn't taste good. He thus has lost appetite , weight is stable. He has intermittent cough - sometimes this is hacking and may through up. He no longer has chest pain or SOB. SBP is much better controlled. No edema. No bleeding. Not taking Norco      Oncologic history reviewed and summarized below  Initially presented with hematuria in August 2014 at which point a CT of his abdomen and pelvis showed a 4.3 cm right upper kidney mass with IVC and right renal vein extension. Underwent a right radical nephrectomy 9/10/14 -there was concern for intraoperative, tumor spillage. Pathology showed an 8 cm clear cell carcinoma invading the hilum, focally invading the renal vein and the inferior vena cava, grade 3, margins negative, lymphatic invasion was present, one positive lymph node found (pT3N1). He developed pulmonary embolism postoperatively. He was placed on Coumadin for 2 years. Had been on surveillance with negative scans until he presented in May 2017 with shortness of breath. CT angiogram at that time showed extensive bilateral pulmonary emboli with right upper lobe nodular opacities. He was started on apixaban but the shortness of breath persisted and  repeat CT angiogram on 9/6/17 showed extensive pulmonary emboli, without any improvement concerning for tumor emboli. A PET scan was obtained on 9/15/17 which demonstrated increased metabolic activity in bilateral metastatic pulmonary emboli.   A 1 cm nodule in the right lower lobe was seen without any uptake. MRI brain was unremarkable. CBC was unremarkable, creatinine 1.2,LDH elevated at 278. Obtained a second opinion at [facility] with Dr. Juan Brown. PET scans were reviewed again. Not found to be a candidate for clinical trials. Agreed with proceeding with Votrient    Treatment  10/18/17 Votrient 800 mg daily: 11/3/17: CTA obtained for chest pain showed continued disease progression. Past Medical History:   Diagnosis Date    Actinic keratosis     Calculus of kidney     Chronic pain     Affecting lower back,Grade 3L5/S1 spondylisthesis    Hypercholesterolemia     Palpitations     PE (pulmonary embolism) 09/01/2014    Post right nephrectomy    Renal carcinoma ([facility] [address]     Dx'd inSept.2014, S/P surgical resection       Past Surgical History:   Procedure Laterality Date    HX APPENDECTOMY      HX RENAL BIOPSY         No Known Allergies    Current Outpatient Prescriptions   Medication Sig Dispense Refill    PAZOPanib (VOTRIENT) 200 mg tablet Take 4 Tabs by mouth daily. Indications: renal cell carcinoma 120 Tab 0    HYDROcodone-acetaminophen (NORCO) 5-325 mg per tablet Take 1 Tab by mouth every four (4) hours as needed for Pain. Max Daily Amount: 6 Tabs. 60 Tab 0    amLODIPine (NORVASC) 5 mg tablet Take 1 Tab by mouth daily. (Patient taking differently: Take 10 mg by mouth daily.) 90 Tab 4    apixaban (ELIQUIS) 5 mg tablet Take 1 Tab by mouth two (2) times a day. Indications: possible lung blood clot 180 Tab 4    ANTIOX#10/OM3/DHA/EPA/LUT/ZEAX (I-CAPS PO) Take  by mouth.          Social History     Social History    Marital status:      Spouse name: N/A    Number of children: N/A    Years of education: N/A     Social History Main Topics    Smoking status: Former Smoker    Smokeless tobacco: Never Used      Comment: 1 cigar a day    Alcohol use Yes      Comment: occassionally    Drug use: No    Sexual activity: Not Asked     Other Topics Concern     Service No    Blood Transfusions No    Caffeine Concern No    Occupational Exposure No    Hobby Hazards No    Sleep Concern No    Stress Concern No    Weight Concern No    Special Diet No    Back Care Yes    Exercise Yes     normal daily activity     Bike Helmet Yes    Seat Belt Yes    Self-Exams Yes     Social History Narrative       Family History   Problem Relation Age of Onset    No Known Problems Mother     Cancer Brother      facial melanoma 2016       ROS  A  review of systems was obtained and is negative except as listed in HPI. ECOG PS is 1  Emotional well being addressed and patient is coping well    Physical Examination:   Visit Vitals    /85    Pulse 86    Temp 98.7 °F (37.1 °C)    Resp 20    Ht 5' 4\" (1.626 m)    Wt 121 lb 9.6 oz (55.2 kg)    SpO2 96%    BMI 20.87 kg/m2     General appearance - alert, tired appearing, and in no distress  Mental status - oriented to person, place, and time  Mouth - mucous membranes moist, pharynx normal without lesions  Neck - supple, no significant adenopathy  Lymphatics - no palpable lymphadenopathy, no hepatosplenomegaly  Chest - clear to auscultation, no wheezes, rales or rhonchi, symmetric air entry  Heart - normal rate, regular rhythm, normal S1, S2, no murmurs, rubs, clicks or gallops  Neurological - normal speech, no focal findings or movement disorder noted  Musculoskeletal - no joint tenderness, deformity or swelling  Extremities - peripheral pulses normal, no pedal edema, no clubbing or cyanosis  Skin - normal coloration and turgor, no rashes, no suspicious skin lesions noted    LABS  Lab Results   Component Value Date/Time    WBC 10.6 11/30/2017 09:08 AM    HGB 13.1 11/30/2017 09:08 AM    HCT 39.9 11/30/2017 09:08 AM    PLATELET 140 64/54/0159 09:08 AM    MCV 87.9 11/30/2017 09:08 AM    ABS.  NEUTROPHILS 7.5 11/30/2017 09:08 AM     Lab Results   Component Value Date/Time    Sodium 135 11/03/2017 09:21 AM    Potassium 4.8 11/03/2017 09:21 AM Chloride 100 11/03/2017 09:21 AM    CO2 28 11/03/2017 09:21 AM    Glucose 111 11/03/2017 09:21 AM    BUN 22 11/03/2017 09:21 AM    Creatinine 1.34 11/03/2017 09:21 AM    GFR est AA >60 11/03/2017 09:21 AM    GFR est non-AA 52 11/03/2017 09:21 AM    Calcium 9.5 11/03/2017 09:21 AM     Lab Results   Component Value Date/Time    AST (SGOT) 39 11/03/2017 09:21 AM    Alk. phosphatase 151 11/03/2017 09:21 AM    Protein, total 7.8 11/03/2017 09:21 AM    Albumin 2.8 11/03/2017 09:21 AM    Globulin 5.0 11/03/2017 09:21 AM    A-G Ratio 0.6 11/03/2017 09:21 AM       PATHOLOGY  Reviewed under oncologic history      CTA chest 9/6/17  1. Extensive pulmonary emboli bilaterally which have not changed significantly  since the 5/24/2017 exam. Given the lack of change over time, the presence of  tumor emboli must be considered. 2. Enlarging bilateral pulmonary nodules favoring metastatic disease. 3. Right lung airspace opacities either representing pulmonary infarcts or  infectious foci    IMAGING  PET/CT 9/15/17  IMPRESSION  IMPRESSION:   1. There are bilateral metastatic pulmonary emboli to the right pulmonary  artery, right upper lobe and right lower lobe pulmonary arteries and left lower  lobe pulmonary arteries demonstrating increased metabolic activity. 2. There is a 1 cm nodule superior segment right lower lobe which does not  demonstrate increased metabolic activity could be inflammatory/infectious or  possibly metastatic. Prabhjot Abad 23X result called to the  at the office.       Reviewed notes from [facility]. PET scan interpretation at [facility] reviewed as well  Second opinion interpretation of outside study dated 9/6/2017. 1.  Large pulmonary emboli bilaterally including the right lobar artery with no evidence of right ventricular strain. Given the PET/CT findings 9/15/2017, this is consistent with tumor emboli.   2.  2.4 cm mass contiguous with the right middle lobe pulmonary artery may BE a large embolus or mass.  3.  Right hilar pulmonary nodule measuring 1.8 cm most likely metastatic disease. 4.  Status post right nephroureterectomy with no evidence of local disease recurrence in the surgical bed. 5.  Nonobstructive 5 mm left renal calculus appears to be within the inferior left renal pelvis. 11/3/17: CTA chest  IMPRESSION  IMPRESSION:   1. Extensive bilateral pulmonary emboli have not changed significantly compared  to the 9/6/2017 examination. Again, tumor emboli must be considered. 2. Enlarging bilateral pulmonary nodules compatible with metastatic disease. 3. New airspace disease right lower lobe may be related to acute infiltrate  versus pulmonary infarct.     23X    ASSESSMENT  Mr. Arti Sainz is a 68 y.o. 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 PLAN    Stage IV renal cell carcinoma T3 N1 M1-   Reviewed prior CTA, PET CT results. Reviewed notes from [facility] and discussed with the patient. Going forward the right hilar pulmonary nodule measuring 1.8 cm might serve as measurable disease. This had grown when last imaged on 11/3/17 to > 3 cm but he had not been on Votient for more than 2 weeks. Started Votrient on 10/18/17 at 800 mg per day  Having grade 3 fatigue, grade 2 diarrhea and feels miserable  Will decrease to 600 mg daily starting today. Hypertension is well controlled on 10 mg of amlodipine now    He will return with scans and blood work in 1 month    Hypertension  Secondary to Votrient. Grade 1 on 10 mg of Amlodipine    Pleuritic chest pain  Resolved    Cough  Secondary to hilar adenopathy  Concerning clinically  Will prescribe Guaifenesin + codeine syrup      Pulmonary tumor emboli  Generally an ominous manifestation. Anticoagulation is not thought to be helpful but some of what we see in his scans may be thrombi.   Continue Eliquis for now    CKD:   Stable creatinine    Discussions about prognosis today  Decreased dose to 600 mg of Votient  CT in 1 month with labs- will have post hydration  See me the same day as MD Mr. Rose Vazquez has a reminder for a \"due or due soon\" health maintenance. I have asked that he contact his primary care provider for follow-up on this health maintenance.

## 2017-11-30 NOTE — TELEPHONE ENCOUNTER
Call placed to pharmacy, spoke with pharmacist, Avery Preciado. Requested clarification for Robitussin dosing. Unable to locate dose ordered. Also needs clarification of quantity, spoke with Dr. Geovanna Vásquez. Call returned to pharmacist. Dose clarified. Codeine-guaifenesin  mg/5 ml to be dispensed in a qty of 120 ml. Pharmacist confirmed dose and read back. Thanked for assistance. Will update patient.

## 2017-11-30 NOTE — TELEPHONE ENCOUNTER
Buffy with [employer] called on patient's behalf requesting a return call regarding medication Codeine-guaiFENesin 6.3-100 mg/5 mL liqd  Please return call to discuss [phone].   beryl

## 2017-11-30 NOTE — PROGRESS NOTES
Outpatient Infusion Center Short Visit Progress Note    1941 Pt admit to [facility] for lab draw ambulatory in stable condition. Labs drawn peripherally and sent for processing. Please review pending lab results in [address] [place] [address]. Patient Vitals for the past 12 hrs:   Temp Pulse Resp BP   11/30/17 0903 97.5 °F (36.4 °C) 89 16 135/87     0915 Pt tolerated treatment well. D/c home ambulatory in no distress. Pt has no further appointments scheduled at this time.

## 2017-12-05 NOTE — TELEPHONE ENCOUNTER
Call to patient. HIPAA verified. Labs needed prior to 12/11. Advised. He will have drawn at [facility]. Explained again the need for scheduling the CT scan after the first of the year so we can schedule hydration. Stated scheduling called and he needs to return their call. He will try to do that today. Thanked for call. Encouraged to call with any additional questions/concerns.

## 2017-12-05 NOTE — PROGRESS NOTES
LFTs reviewed    ALT 1.5 X normal    Continue Votirient at 600 mg and recheck LFTs  By 12/11/17. If ALT goes up further then but stays < 3 x upper limit of normal then decrease dose to 400 mg daily. If ALT increased to > 3 x ULN then interrupt dose    Nursing could we have him get his LFTs checked by 12/11/17?

## 2017-12-06 NOTE — TELEPHONE ENCOUNTER
----- Message from [employer]kboard Andrzej sent at 12/6/2017  9:35 AM EST -----  Regarding: Dr. Danielle Hunt  Pt is requesting sooner availability for a f/u appt. Pt best contact [phone].

## 2017-12-06 NOTE — TELEPHONE ENCOUNTER
Patient called requesting a return call from [facility]. Patient stated it is regards to scheduling tests. Please return call to discuss [phone].   beryl

## 2017-12-08 NOTE — TELEPHONE ENCOUNTER
Labs reviewed with patient. HIPAA verified. Votrient 400 mg to be started tomorrow. Verbalized understanding.

## 2017-12-26 NOTE — TELEPHONE ENCOUNTER
Charles Hyman states that she is still waiting on a better DX for the patients CT scan that is being billed via medicare.

## 2017-12-29 NOTE — PROGRESS NOTES
Chief Complaint   Patient presents with    Chemotherapy     follow up          HPI:      Joseluis Eaton is a 68 y.o. male. Renal cell carcinoma, presently taking Votrient-dose recently decreased from 4 tabs to 2 tabs due to elevated LFTs. Feels cold and lousy all the time. Has not noticed much difference since reducing the dose. Remains on apixaban for PE. It is felt that he likely has tumor emboli involving his lungs. Scheduled for CT at [facility] next week. No Known Allergies    Current Outpatient Prescriptions   Medication Sig    amLODIPine (NORVASC) 5 mg tablet Take 2 Tabs by mouth daily.  Codeine-guaiFENesin 6.3-100 mg/5 mL liqd Take 5 mL by mouth nightly as needed. Max Daily Amount: 5 mL. Indications: Cough    PAZOPanib (VOTRIENT) 200 mg tablet Take 4 Tabs by mouth daily. Indications: renal cell carcinoma    apixaban (ELIQUIS) 5 mg tablet Take 1 Tab by mouth two (2) times a day. Indications: possible lung blood clot    ANTIOX#10/OM3/DHA/EPA/LUT/ZEAX (I-CAPS PO) Take  by mouth. No current facility-administered medications for this visit. Past Medical History:   Diagnosis Date    Actinic keratosis     Calculus of kidney     Chronic pain     Affecting lower back,Grade 3L5/S1 spondylisthesis    Hypercholesterolemia     Palpitations     PE (pulmonary embolism) 09/01/2014    Post right nephrectomy    Renal carcinoma ([facility] [address]     Dx'd inSept.2014, S/P surgical resection         ROS:  Denies fever, chills, cough, chest pain, SOB,  nausea, vomiting, or diarrhea. Denies wt loss, wt gain, hemoptysis, hematochezia or melena.     Physical Examination:    /80 (BP 1 Location: Left arm, BP Patient Position: Sitting)  Pulse 74  Resp 14  Ht 5' 4\" (1.626 m)  Wt 117 lb (53.1 kg)  BMI 20.08 kg/m2    General: Alert and Ox3, Fluent speech  HEENT:  NC/AT, EOMI, OP: clear  Neck:  Supple, no adenopathy, JVD, mass or bruit  Chest:  Clear to Ausculation, without wheezes, rales, rubs or cheko  Cardiac: RRR  Abdomen:  +BS, soft, nontender without palpable HSM  Extremities:  No cyanosis, clubbing or edema  Neurologic:  Ambulatory without assist, CN 2-12 grossly intact. Moves all extremities. Skin: no rash  Lymphadenopathy: no cervical or supraclavicular nodes      ASSESSMENT AND PLAN:     1. Metstatic renal cell ca:  Slowly progressing with side effects from Votrient  2. HTN is better  3. Elevated Liver enzymes due to Votrient ? mets    No orders of the defined types were placed in this encounter.       Moe Leong MD, [address]

## 2017-12-29 NOTE — MR AVS SNAPSHOT
Visit Information Date & Time Provider Department Dept. Phone Encounter #  
 12/29/2017  9:30 AM Elias Fulton MD [address] [place] [address] [id number] Follow-up Instructions Return in about 6 weeks (around 2/9/2018). Follow-up and Disposition History Upcoming Health Maintenance Date Due  
 MEDICARE YEARLY EXAM 6/30/2018 GLAUCOMA SCREENING Q2Y 6/2/2019 DTaP/Tdap/Td series (2 - Td) 10/7/2025 Allergies as of 12/29/2017  Review Complete On: 12/29/2017 By: Elias Fulton MD  
 No Known Allergies Current Immunizations  Reviewed on 9/7/2017 Name Date Influenza High Dose Vaccine PF 9/1/2017  1:37 PM  
 Influenza Vaccine 11/4/2016, 10/7/2015 12:17 PM  
 Pneumococcal Conjugate (PCV-13) 6/29/2017  4:05 PM  
 Pneumococcal Polysaccharide (PPSV-23) 11/23/2010, 10/28/2005 Tdap 10/7/2015 12:19 PM  
 Zoster Vaccine, Live 4/4/2011 Not reviewed this visit You Were Diagnosed With   
  
 Codes Comments Bilateral pulmonary embolism (HCC)    -  Primary ICD-10-CM: I26.99 
ICD-9-CM: 415.19 Primary clear cell carcinoma of right kidney ([facility] [address]     ICD-10-CM: C64.1 ICD-9-CM: 189.0 Tumor embolus ([facility] [address]     ICD-10-CM: C79.9 ICD-9-CM: 239.9 Vitals BP Pulse Resp Height(growth percentile) Weight(growth percentile) BMI  
 130/80 (BP 1 Location: Left arm, BP Patient Position: Sitting) 74 14 5' 4\" (1.626 m) 117 lb (53.1 kg) 20.08 kg/m2 Smoking Status Former Smoker BMI and BSA Data Body Mass Index Body Surface Area 20.08 kg/m 2 1.55 m 2 Preferred Pharmacy Pharmacy Name Phone [address], [place] [phone] Your Updated Medication List  
  
   
This list is accurate as of: 12/29/17 10:51 AM.  Always use your most recent med list. amLODIPine 5 mg tablet Commonly known as:  Love Breach Take 2 Tabs by mouth daily. apixaban 5 mg tablet Commonly known as:  Nubia Shield Take 1 Tab by mouth two (2) times a day. Indications: possible lung blood clot Codeine-guaiFENesin 6.3-100 mg/5 mL Liqd Take 5 mL by mouth nightly as needed. Max Daily Amount: 5 mL. Indications: Cough I-CAPS PO Take  by mouth. PAZOPanib 200 mg tablet Commonly known as:  Merwyn Shawn Take 4 Tabs by mouth daily. Indications: renal cell carcinoma Follow-up Instructions Return in about 6 weeks (around 2/9/2018). To-Do List   
 01/04/2018 11:00 AM  
  Appointment with [address] at [facility] [address] [employer] ([phone]) DIET RESTRICTIONS - NPO, do not eat or drink 4 hours prior to test.  EXAM INSTRUCTIONS - Arrive 2 hours prior to your appt. to allow time to drink oral contrast.  Disregard instruction below to arrive 30 min. early. - For female patients age 8to 21years old: If you have not had a pregnancy result by your physician within 24 hours of your exam, you will be required to take a pregnancy test when you arrive at the hospital.  2 [facility] a list of all medications you are currently taking, including over the counter medications. - Only patients will be allowed in the exam room. This includes children. - Children under the age of 15 may not be left unattended. - [address] [place] [address] patients must have an armband and be accompanied by a caregiver or family member. - If you have a hand-written prescription for this exam, you must bring it with you on the day of your exam. - Bring all relevant prior images from any facility outside of [facility] with you on the day of your exam.  Failure to provide images may delay reading by Radiologist.  If you have questions or need to reschedule or cancel your appointment, call [phone].  
  
 01/04/2018 12:00 PM  
  Appointment with [address] at [facility] [address] [employer] ([phone]) DIET RESTRICTIONS - NPO, do not eat or drink 4 hours prior to test. GENERAL INSTRUCTIONS - Bring a list of all medications you are currently taking, including over the counter medications. - Only patients will be allowed in the exam room. This includes children. - Children under the age of 15 may not be left unattended. - [address] patients must have an armband and be accompanied by a caregiver or family member. - If you have a hand-written prescription for this exam, you must bring it with you on the day of your exam. - Bring all relevant prior images from any facility outside of [facility] with you on the day of your exam.  Failure to provide images may delay reading by Radiologist.  If you have questions or need to reschedule or cancel your appointment, call [phone].  
  
 01/05/2018 10:00 AM  
  Appointment with Octaviano Roy 2 at [facility] ([phone]) Introducing [facility]! Dear Flip Lopez: Thank you for requesting a nfon account. Our records indicate that you already have an active nfon account. You can access your account anytime at [email or web address] Did you know that you can access your hospital and ER discharge instructions at any time in nfon? You can also review all of your test results from your hospital stay or ER visit. Additional Information If you have questions, please visit the Frequently Asked Questions section of the nfon website at [email or web address] Remember, nfon is NOT to be used for urgent needs. For medical emergencies, dial 911. Now available from your iPhone and Android! Please provide this summary of care documentation to your next provider. Your primary care clinician is listed as Abel Kwong. If you have any questions after today's visit, please call [phone].

## 2018-01-01 ENCOUNTER — NURSE NAVIGATOR (OUTPATIENT)
Dept: ONCOLOGY | Age: 78
End: 2018-01-01

## 2018-01-01 ENCOUNTER — TELEPHONE (OUTPATIENT)
Dept: ONCOLOGY | Age: 78
End: 2018-01-01

## 2018-01-01 ENCOUNTER — TELEPHONE (OUTPATIENT)
Dept: FAMILY MEDICINE CLINIC | Age: 78
End: 2018-01-01

## 2018-01-01 ENCOUNTER — OFFICE VISIT (OUTPATIENT)
Dept: ONCOLOGY | Age: 78
End: 2018-01-01

## 2018-01-01 ENCOUNTER — OFFICE VISIT (OUTPATIENT)
Dept: FAMILY MEDICINE CLINIC | Age: 78
End: 2018-01-01

## 2018-01-01 ENCOUNTER — DOCUMENTATION ONLY (OUTPATIENT)
Dept: ONCOLOGY | Age: 78
End: 2018-01-01

## 2018-01-01 ENCOUNTER — DOCUMENTATION ONLY (OUTPATIENT)
Dept: FAMILY MEDICINE CLINIC | Age: 78
End: 2018-01-01

## 2018-01-01 VITALS
DIASTOLIC BLOOD PRESSURE: 75 MMHG | WEIGHT: 115.6 LBS | SYSTOLIC BLOOD PRESSURE: 125 MMHG | RESPIRATION RATE: 18 BRPM | BODY MASS INDEX: 19.74 KG/M2 | OXYGEN SATURATION: 91 % | TEMPERATURE: 97.8 F | HEIGHT: 64 IN | HEART RATE: 92 BPM

## 2018-01-01 VITALS
OXYGEN SATURATION: 96 % | RESPIRATION RATE: 16 BRPM | SYSTOLIC BLOOD PRESSURE: 110 MMHG | HEART RATE: 88 BPM | DIASTOLIC BLOOD PRESSURE: 66 MMHG

## 2018-01-01 VITALS
SYSTOLIC BLOOD PRESSURE: 114 MMHG | DIASTOLIC BLOOD PRESSURE: 78 MMHG | OXYGEN SATURATION: 94 % | TEMPERATURE: 97.9 F | HEIGHT: 67 IN | WEIGHT: 113 LBS | HEART RATE: 104 BPM | RESPIRATION RATE: 20 BRPM | BODY MASS INDEX: 17.74 KG/M2

## 2018-01-01 DIAGNOSIS — C79.9: ICD-10-CM

## 2018-01-01 DIAGNOSIS — C64.9 CARCINOMA OF KIDNEY, UNSPECIFIED LATERALITY (HCC): ICD-10-CM

## 2018-01-01 DIAGNOSIS — I26.99 BILATERAL PULMONARY EMBOLISM (HCC): ICD-10-CM

## 2018-01-01 DIAGNOSIS — Z79.01 ANTICOAGULANT LONG-TERM USE: ICD-10-CM

## 2018-01-01 DIAGNOSIS — R06.02 SOB (SHORTNESS OF BREATH) ON EXERTION: ICD-10-CM

## 2018-01-01 DIAGNOSIS — N18.1 STAGE 1 CHRONIC KIDNEY DISEASE: ICD-10-CM

## 2018-01-01 DIAGNOSIS — C64.9 CARCINOMA OF KIDNEY, UNSPECIFIED LATERALITY (HCC): Primary | ICD-10-CM

## 2018-01-01 DIAGNOSIS — C78.00 MALIGNANT NEOPLASM METASTATIC TO LUNG, UNSPECIFIED LATERALITY (HCC): ICD-10-CM

## 2018-01-01 DIAGNOSIS — N06.9 ISOLATED PROTEINURIA WITH MORPHOLOGIC LESION: ICD-10-CM

## 2018-01-01 DIAGNOSIS — M25.571 ACUTE RIGHT ANKLE PAIN: Primary | ICD-10-CM

## 2018-01-01 DIAGNOSIS — C64.9 KIDNEY CANCER, PRIMARY, WITH METASTASIS FROM KIDNEY TO OTHER SITE, UNSPECIFIED LATERALITY (HCC): Primary | ICD-10-CM

## 2018-01-01 DIAGNOSIS — N06.9 ISOLATED PROTEINURIA WITH MORPHOLOGIC LESION: Primary | ICD-10-CM

## 2018-01-01 DIAGNOSIS — C64.1 RENAL CELL CARCINOMA OF RIGHT KIDNEY METASTATIC TO OTHER SITE (HCC): Primary | ICD-10-CM

## 2018-01-01 LAB
ALBUMIN SERPL-MCNC: 3.5 G/DL (ref 3.5–4.8)
ALBUMIN/GLOB SERPL: 1.2 {RATIO} (ref 1.2–2.2)
ALP SERPL-CCNC: 141 IU/L (ref 39–117)
ALT SERPL-CCNC: 96 IU/L (ref 0–44)
AST SERPL-CCNC: 84 IU/L (ref 0–40)
BASOPHILS # BLD AUTO: 0 X10E3/UL (ref 0–0.2)
BASOPHILS NFR BLD AUTO: 0 %
BILIRUB SERPL-MCNC: 0.3 MG/DL (ref 0–1.2)
BUN SERPL-MCNC: 22 MG/DL (ref 8–27)
BUN/CREAT SERPL: 25 (ref 10–24)
CALCIUM SERPL-MCNC: 8.5 MG/DL (ref 8.6–10.2)
CHLORIDE SERPL-SCNC: 94 MMOL/L (ref 96–106)
CO2 SERPL-SCNC: 21 MMOL/L (ref 18–29)
CREAT SERPL-MCNC: 0.88 MG/DL (ref 0.76–1.27)
EOSINOPHIL # BLD AUTO: 0.2 X10E3/UL (ref 0–0.4)
EOSINOPHIL NFR BLD AUTO: 2 %
ERYTHROCYTE [DISTWIDTH] IN BLOOD BY AUTOMATED COUNT: 20.1 % (ref 12.3–15.4)
ERYTHROCYTE [SEDIMENTATION RATE] IN BLOOD BY WESTERGREN METHOD: 42 MM/HR (ref 0–30)
GFR SERPLBLD CREATININE-BSD FMLA CKD-EPI: 83 ML/MIN/1.73
GFR SERPLBLD CREATININE-BSD FMLA CKD-EPI: 96 ML/MIN/1.73
GLOBULIN SER CALC-MCNC: 3 G/DL (ref 1.5–4.5)
GLUCOSE SERPL-MCNC: 101 MG/DL (ref 65–99)
HCT VFR BLD AUTO: 33.7 % (ref 37.5–51)
HGB BLD-MCNC: 11.3 G/DL (ref 13–17.7)
IMM GRANULOCYTES # BLD: 0 X10E3/UL (ref 0–0.1)
IMM GRANULOCYTES NFR BLD: 0 %
LYMPHOCYTES # BLD AUTO: 1.3 X10E3/UL (ref 0.7–3.1)
LYMPHOCYTES NFR BLD AUTO: 18 %
MCH RBC QN AUTO: 31.8 PG (ref 26.6–33)
MCHC RBC AUTO-ENTMCNC: 33.5 G/DL (ref 31.5–35.7)
MCV RBC AUTO: 95 FL (ref 79–97)
MONOCYTES # BLD AUTO: 0.9 X10E3/UL (ref 0.1–0.9)
MONOCYTES NFR BLD AUTO: 12 %
NEUTROPHILS # BLD AUTO: 5.1 X10E3/UL (ref 1.4–7)
NEUTROPHILS NFR BLD AUTO: 68 %
PLATELET # BLD AUTO: 207 X10E3/UL (ref 150–379)
POTASSIUM SERPL-SCNC: 4.5 MMOL/L (ref 3.5–5.2)
PROT SERPL-MCNC: 6.5 G/DL (ref 6–8.5)
RBC # BLD AUTO: 3.55 X10E6/UL (ref 4.14–5.8)
SODIUM SERPL-SCNC: 133 MMOL/L (ref 134–144)
URATE SERPL-MCNC: 5.6 MG/DL (ref 3.7–8.6)
WBC # BLD AUTO: 7.4 X10E3/UL (ref 3.4–10.8)

## 2018-01-01 RX ORDER — CEPHALEXIN 500 MG/1
500 CAPSULE ORAL 4 TIMES DAILY
Qty: 40 CAP | Refills: 0 | Status: SHIPPED | OUTPATIENT
Start: 2018-01-01 | End: 2018-01-01

## 2018-01-01 RX ORDER — OXYCODONE HYDROCHLORIDE 5 MG/1
TABLET ORAL
COMMUNITY
Start: 2018-01-01

## 2018-01-01 RX ORDER — METHYLPREDNISOLONE 4 MG/1
TABLET ORAL
Qty: 1 DOSE PACK | Refills: 0 | Status: SHIPPED | OUTPATIENT
Start: 2018-01-01 | End: 2018-01-01 | Stop reason: ALTCHOICE

## 2018-01-01 RX ORDER — METHYLPREDNISOLONE 4 MG/1
TABLET ORAL
COMMUNITY
Start: 2018-01-01

## 2018-01-01 RX ORDER — DOCUSATE SODIUM 100 MG/1
100 CAPSULE, LIQUID FILLED ORAL 2 TIMES DAILY
Status: ON HOLD | COMMUNITY
End: 2018-01-01

## 2018-01-01 RX ORDER — AMLODIPINE BESYLATE 5 MG/1
10 TABLET ORAL DAILY
Qty: 180 TAB | Refills: 3 | OUTPATIENT
Start: 2018-01-01 | End: 2018-01-01

## 2018-01-01 RX ORDER — HYDROCODONE BITARTRATE AND ACETAMINOPHEN 5; 325 MG/1; MG/1
1 TABLET ORAL
Qty: 60 TAB | Refills: 0 | Status: SHIPPED | OUTPATIENT
Start: 2018-01-01

## 2018-01-01 RX ORDER — ONDANSETRON 4 MG/1
4 TABLET, ORALLY DISINTEGRATING ORAL
Qty: 60 TAB | Refills: 2 | Status: SHIPPED | OUTPATIENT
Start: 2018-01-01

## 2018-01-05 NOTE — TELEPHONE ENCOUNTER
Pt rescheduled CT and hydration for 1/8/18 due to inclement weather. Transferred patient to  for scheduling follow up with Jeancarlos. Discussed with Britt. Pt will continue 400 mg votrient until labs reviewed from 1/8/18. Call to [address] [phone] . Spoke with Sofia Hope - pharmacist.  Renewed votrient 400 mg qday for one month. Urgent processing requested. Marcia Miller / Raina Posadas is taking over [facility] customers for their non federal employees.

## 2018-01-05 NOTE — PROGRESS NOTES
Patient had appointment for labs (recheck LFT's) and IV fluids yesterday 1/4/17. Given inclement weather, unable to make this appointment. Will reschedule for Monday. Patient currently out of Votrient - will process script for 400mg dosing daily today and possible dose changes per labs on Monday. Will order 200mg tabs so if we need to dose reduce further we won't need to process further medication. CT's also planned for yesterday have been rescheduled for Monday.     Will forward to primary team.

## 2018-01-08 NOTE — TELEPHONE ENCOUNTER
Pt states he talks to Britney Pratt on Friday and was told the medication Voltrint should be received by Saturday.  Pt still has not received it

## 2018-01-08 NOTE — TELEPHONE ENCOUNTER
Call to [address] spoke with [facility] prescription was under review by pharmacist. Quantity discrepancy for what they offer and what was ordered, was not shipped this weekend as expected. Rosa Elena Lacks that patient is likely out of medication at this time and that this is an urgent need. Call/hold time 45 min, per Ruperto she spoke with pharmacist who will contact patient today before end of business to set up shipment to patient. Call to patient, HIPAA verified. Patient is currently out of drug at this time, patient updated on above. Advised that they will contact him before end of day to set up delivery. Advised that provider updated on status as well. Encouraged to contact our office tomorrow if they do not contact him today for delivery. Patient verbalized understanding and thanked for assistance.

## 2018-01-10 NOTE — TELEPHONE ENCOUNTER
Returned call HIPAA verified. Pt disappointed and expressed \"frustration\" he has just rec'd his votrient prescription. Pt disappointed he has not received a response from us. Advised patient mychart message was sent this this morning, I advised patient to continue 400 mg votrient when rec'd. States he never rec'd my my chart message. Explained again the possible delays of receiving medication. Pt now aware to restart his medication. We will review scans and labs with his Friday appointment with provider. Agreeable to plan.

## 2018-01-10 NOTE — TELEPHONE ENCOUNTER
Dedrick Ohjohnathon (pt's wife) called in left vmail stating pt has not had his meds since last Friday. She stated they just received meds today and would like a c/b in order to know what to do going forward.  Please c/b @ [phone]

## 2018-01-11 NOTE — TELEPHONE ENCOUNTER
ONCOLOGY NURSE NAVIGATOR    Rec'ed VM from Mr. Nickie Chisholm on 1/8 and from Mrs. Nickie Chisholm on 1/10 raising concern and asking for assistance with barrier to getting votrient delivered in timely manner from specialty pharmacy. Returned call and spoke with Mrs. Nickie Chisholm. Apologized for not being available to her and her  at the times they phoned. Noted that I can see much attention to their concerns documented in his MR.    Mrs. Nickie Chisholm notes that her  has been 'switched' from [place] [address] to [address] [place] [address] [employer] to [employer]. During these transitions, he has missed several does of votrient. The Obadiah Swarthmore' perception is that the specialty pharmacies do not transfer complete patient info at transition times. They understand their responsibility to order needed votrient refills, but they are generally \"frustrated and stressed\" by poor service from specialty pharmacies. They request oversight from oncology office or specialty pharmacy or both to ensure seamless service. Mr. Nickie Chisholm will have labs drawn today at [facility] and has appt tomorrow with Dr. Dara Shah at 3pm.  They would like assurance at the appt tomorrow that this situation is addressed. Active listening and support offered.   I'll discuss with Dr. Tessa Arrington staff    Will aim to see the [facility] tomorrow with Dr. Dara Shah and her staff    Arley Renae MS RN AOCNS

## 2018-01-11 NOTE — TELEPHONE ENCOUNTER
CT results available. Per [facility], they will call patient today and draw labs as requested so we will have availability for Fridays provider appointment. HIPAA verified.

## 2018-01-12 PROBLEM — C64.9 CARCINOMA OF KIDNEY (HCC): Status: ACTIVE | Noted: 2017-01-01

## 2018-01-12 PROBLEM — N06.9 ISOLATED PROTEINURIA WITH MORPHOLOGIC LESION: Status: ACTIVE | Noted: 2018-01-01

## 2018-01-12 PROBLEM — N18.1 STAGE 1 CHRONIC KIDNEY DISEASE: Status: ACTIVE | Noted: 2018-01-01

## 2018-01-12 NOTE — MR AVS SNAPSHOT
Visit Information Date & Time Provider Department Dept. Phone Encounter #  
 1/12/2018  3:00 PM Juve Hanna MD [facility] Oncology at [facility] [phone] [id number] Your Appointments 2/13/2018  1:30 PM  
Follow Up with Juve Hanna MD  
[facility] at [facility]) Appt Note: 1mo fu  
 [address] [facility] [address]  
[phone]  
  
   
 [address] [address] [facility] [address]  
  
    
 2/15/2018 11:00 AM  
ESTABLISHED PATIENT with MD Danay Fernando 38 ([facility]) Appt Note: 2 MO F/U  
 [address] [facility] [address] [facility] [id number] [phone]  
  
   
 [address] [address] Upcoming Health Maintenance Date Due  
 MEDICARE YEARLY EXAM 6/30/2018 GLAUCOMA SCREENING Q2Y 6/2/2019 DTaP/Tdap/Td series (2 - Td) 10/7/2025 Allergies as of 1/12/2018  Review Complete On: 1/12/2018 By: Tiffanie Figueroa RN No Known Allergies Current Immunizations  Reviewed on 1/8/2018 Name Date Influenza High Dose Vaccine PF 9/1/2017  1:37 PM  
 Influenza Vaccine 11/4/2016, 10/7/2015 12:17 PM  
 Pneumococcal Conjugate (PCV-13) 6/29/2017  4:05 PM  
 Pneumococcal Polysaccharide (PPSV-23) 11/23/2010, 10/28/2005 Tdap 10/7/2015 12:19 PM  
 Zoster Vaccine, Live 4/4/2011 Not reviewed this visit You Were Diagnosed With   
  
 Codes Comments Isolated proteinuria with morphologic lesion    -  Primary ICD-10-CM: N06.9 ICD-9-CM: 817. 9 Carcinoma of kidney, unspecified laterality ([facility] [address]     ICD-10-CM: C64.9 ICD-9-CM: 189.0 Malignant neoplasm metastatic to lung, unspecified laterality ([facility] [address]     ICD-10-CM: C78.00 ICD-9-CM: 197.0 Bilateral pulmonary embolism (HCC)     ICD-10-CM: I26.99 
ICD-9-CM: 415.19 Anticoagulant long-term use     ICD-10-CM: Z79.01 
ICD-9-CM: V58.61 Stage 1 chronic kidney disease     ICD-10-CM: N18.1 ICD-9-CM: 226. 1 Vitals BP Pulse Temp Resp Height(growth percentile) Weight(growth percentile) 125/75 (BP 1 Location: Left arm, BP Patient Position: Sitting) 92 97.8 °F (36.6 °C) (Oral) 18 5' 4\" (1.626 m) 115 lb 9.6 oz (52.4 kg) SpO2 BMI Smoking Status 91% 19.84 kg/m2 Former Smoker Vitals History BMI and BSA Data Body Mass Index Body Surface Area  
 19.84 kg/m 2 1.54 m 2 Preferred Pharmacy Pharmacy Name Phone [address] Inder[address]it [address] [place] [phone] Your Updated Medication List  
  
   
This list is accurate as of: 1/12/18  4:47 PM.  Always use your most recent med list. amLODIPine 5 mg tablet Commonly known as:  Larayne Lázaro Take 2 Tabs by mouth daily. apixaban 5 mg tablet Commonly known as:  Isabela Grissom Take 1 Tab by mouth two (2) times a day. Indications: possible lung blood clot Codeine-guaiFENesin 6.3-100 mg/5 mL Liqd Take 5 mL by mouth nightly as needed. Max Daily Amount: 5 mL. Indications: Cough I-CAPS PO Take  by mouth. PAZOPanib 200 mg tablet Commonly known as:  Mai Hampton Take two tabs by mouth daily  Indications: renal cell carcinoma To-Do List   
 01/12/2018 Lab:  PROTEIN ELECTRO, 24-HOUR URINE   
  
 01/19/2018 Lab:  CBC WITH AUTOMATED DIFF   
  
 01/19/2018 Lab:  METABOLIC PANEL, COMPREHENSIVE [facility] HE[facility]! Dear Birdie Ji: Thank you for requesting a Justin[employer] account. Our records indicate that you already have an active Justinmind account. You can access your account anytime at [email or web address] Did you know that you can access your hospital and ER discharge instructions at any time in Justinmind? You can also review all of your test results from your hospital stay or ER visit. Additional Information If you have questions, please visit the Frequently Asked Questions section of the ZapMe website at [email or web address] Remember, ZapMe is NOT to be used for urgent needs. For medical emergencies, dial 911. Now available from your iPhone and Android! Please provide this summary of care documentation to your next provider. Your primary care clinician is listed as Rere Ramsey. If you have any questions after today's visit, please call [phone].

## 2018-01-12 NOTE — PROGRESS NOTES
Hematology/Oncology follow-up    REASON FOR visit: Stage IV Renal cell carcinoma    HISTORY OF PRESENT ILLNESS: Mr. Armani Diaz is a 68 y.o. male with hyperlipidemia, hypertension, history of renal stones and actinic keratosis with stage IV renal cell carcinoma. Today he presents for follow-up after starting 800 mg of Votrient on 10/18/17. He was unable to tolerate the dose and hence decreased to 600 mg. He had grade 2 -3 elevation of LFTs and decreased to 400 mg subsequently. He returns for a follow up. He has been feeling weak, he is worn out, has some flatulence, stools are loose, He has no appetite , loosing weight, he is SOB, going to the bathroom is a chore, no CP, rare discomfort with coughing episodes. Cpugh is unchanged- worse early am and gets better during the day. Has occasional blood when he blows his nose. SBP is much well controlled. No edema. He is always cold. He has had some unsteadiness in gait. Oncologic history reviewed and summarized below  Initially presented with hematuria in August 2014 at which point a CT of his abdomen and pelvis showed a 4.3 cm right upper kidney mass with IVC and right renal vein extension. Underwent a right radical nephrectomy 9/10/14 -there was concern for intraoperative, tumor spillage. Pathology showed an 8 cm clear cell carcinoma invading the hilum, focally invading the renal vein and the inferior vena cava, grade 3, margins negative, lymphatic invasion was present, one positive lymph node found (pT3N1). He developed pulmonary embolism postoperatively. He was placed on Coumadin for 2 years. Had been on surveillance with negative scans until he presented in May 2017 with shortness of breath. CT angiogram at that time showed extensive bilateral pulmonary emboli with right upper lobe nodular opacities. He was started on apixaban but the shortness of breath persisted and  repeat CT angiogram on 9/6/17 showed extensive pulmonary emboli, without any improvement concerning for tumor emboli. A PET scan was obtained on 9/15/17 which demonstrated increased metabolic activity in bilateral metastatic pulmonary emboli. A 1 cm nodule in the right lower lobe was seen without any uptake. MRI brain was unremarkable. CBC was unremarkable, creatinine 1.2,LDH elevated at 278. Obtained a second opinion at [facility] with Dr. Shahida Ponce. PET scans were reviewed again. Not found to be a candidate for clinical trials. Agreed with proceeding with Votrient    Treatment  10/18/17 Votrient 800 mg daily: 11/3/17: CTA obtained for chest pain showed continued disease progression. Dose decreased to 400 mg due to grade 2 LFT elevation    Past Medical History:   Diagnosis Date    Actinic keratosis     Calculus of kidney     Chronic pain     Affecting lower back,Grade 3L5/S1 spondylisthesis    Hypercholesterolemia     Palpitations     PE (pulmonary embolism) 09/01/2014    Post right nephrectomy    Renal carcinoma ([facility] [address]     Dx'd inSept.2014, S/P surgical resection       Past Surgical History:   Procedure Laterality Date    HX APPENDECTOMY      HX RENAL BIOPSY         No Known Allergies    Current Outpatient Prescriptions   Medication Sig Dispense Refill    PAZOPanib (VOTRIENT) 200 mg tablet Take two tabs by mouth daily  Indications: renal cell carcinoma 90 Tab 0    amLODIPine (NORVASC) 5 mg tablet Take 2 Tabs by mouth daily. 60 Tab 5    Codeine-guaiFENesin 6.3-100 mg/5 mL liqd Take 5 mL by mouth nightly as needed. Max Daily Amount: 5 mL. Indications: Cough 1 Bottle 0    apixaban (ELIQUIS) 5 mg tablet Take 1 Tab by mouth two (2) times a day. Indications: possible lung blood clot 180 Tab 4    ANTIOX#10/OM3/DHA/EPA/LUT/ZEAX (I-CAPS PO) Take  by mouth.          Social History     Social History    Marital status:      Spouse name: N/A    Number of children: N/A    Years of education: N/A     Social History Main Topics    Smoking status: Former Smoker    Smokeless tobacco: Never Used      Comment: 1 cigar a day    Alcohol use Yes      Comment: occassionally    Drug use: No    Sexual activity: Not Asked     Other Topics Concern     Service No    Blood Transfusions No    Caffeine Concern No    Occupational Exposure No    Hobby Hazards No    Sleep Concern No    Stress Concern No    Weight Concern No    Special Diet No    Back Care Yes    Exercise Yes     normal daily activity     Bike Helmet Yes    Seat Belt Yes    Self-Exams Yes     Social History Narrative       Family History   Problem Relation Age of Onset    No Known Problems Mother     Cancer Brother      facial melanoma 2016       ROS  A  review of systems was obtained and is negative except as listed in HPI. ECOG PS is 1  Emotional well being addressed and patient is coping well    Physical Examination:   Visit Vitals    Ht 5' 4\" (1.626 m)    Wt 115 lb 9.6 oz (52.4 kg)    BMI 19.84 kg/m2     General appearance - alert, tired appearing, and in no distress  Mental status - oriented to person, place, and time  Mouth - mucous membranes moist, pharynx normal without lesions  Neck - supple, no significant adenopathy  Lymphatics - no palpable lymphadenopathy, no hepatosplenomegaly  Chest - clear to auscultation, no wheezes, rales or rhonchi, symmetric air entry  Heart - normal rate, regular rhythm, normal S1, S2, no murmurs, rubs, clicks or gallops  Musculoskeletal - no joint tenderness, deformity or swelling  Extremities - peripheral pulses normal, no pedal edema, no clubbing or cyanosis  Skin - normal coloration and turgor, no rashes, no suspicious skin lesions noted    LABS  Lab Results   Component Value Date/Time    WBC 8.9 01/11/2018 01:30 PM    HGB 10.4 01/11/2018 01:30 PM    HCT 34.0 01/11/2018 01:30 PM    PLATELET 771 46/37/4896 01:30 PM    MCV 98.0 01/11/2018 01:30 PM    ABS.  NEUTROPHILS 7.5 11/30/2017 09:08 AM     Lab Results   Component Value Date/Time    Sodium 136 01/11/2018 01:30 PM Potassium 5.2 01/11/2018 01:30 PM    Chloride 100 01/11/2018 01:30 PM    CO2 26 01/11/2018 01:30 PM    Glucose 150 01/11/2018 01:30 PM    BUN 20 01/11/2018 01:30 PM    Creatinine 1.53 01/11/2018 01:30 PM    GFR est AA 54 01/11/2018 01:30 PM    GFR est non-AA 44 01/11/2018 01:30 PM    Calcium 8.8 01/11/2018 01:30 PM     Lab Results   Component Value Date/Time    AST (SGOT) 53 01/11/2018 01:30 PM    Alk. phosphatase 130 01/11/2018 01:30 PM    Protein, total 6.9 01/11/2018 01:30 PM    Albumin 2.4 01/11/2018 01:30 PM    Globulin 4.5 01/11/2018 01:30 PM    A-G Ratio 0.5 01/11/2018 01:30 PM     Results for Aisha Ly (MRN [id number]) as of 1/12/2018 15:16   Ref. Range 11/30/2017 09:08   Creatinine, urine Latest Units: mg/dL 140.00   Protein, urine random Latest Ref Range: 0.0 - 11.9 mg/dL 142 (H)       PATHOLOGY  Reviewed under oncologic history    RADIOLOGY  Reviewed notes from [facility]. PET scan interpretation at [facility] reviewed as well  Second opinion interpretation of outside study dated 9/6/2017. 1.  Large pulmonary emboli bilaterally including the right lobar artery with no evidence of right ventricular strain. Given the PET/CT findings 9/15/2017, this is consistent with tumor emboli. 2.  2.4 cm mass contiguous with the right middle lobe pulmonary artery may BE a large embolus or mass. 3.  Right hilar pulmonary nodule measuring 1.8 cm most likely metastatic disease. 4.  Status post right nephroureterectomy with no evidence of local disease recurrence in the surgical bed. 5.  Nonobstructive 5 mm left renal calculus appears to be within the inferior left renal pelvis. 11/3/17: CTA chest  IMPRESSION  IMPRESSION:   1. Extensive bilateral pulmonary emboli have not changed significantly compared  to the 9/6/2017 examination. Again, tumor emboli must be considered. 2. Enlarging bilateral pulmonary nodules compatible with metastatic disease.   3. New airspace disease right lower lobe may be related to acute infiltrate  versus pulmonary infarct.     1/8/18  IMPRESSION  IMPRESSION:     New right lung air cavities which may represent pneumatoceles. Extensive  bilateral pulmonary filling defects are unchanged and may indicate extensive  tumor emboli. Pulmonary metastases again noted. Other findings described above  have not changed significantly compared to prior CT exams.       ASSESSMENT  Mr. Kerri Ashton is a 68 y.o. 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 PLAN    Stage IV renal cell carcinoma T3 N1 M1-   See several past discussions pertaining to management    Grade 3 fatigue on Votrient prompted dose reduction to 600 mg, grade 3 LFT elevated then prompted further reduction to 400 mg. He continues to do poorly- continued fatigue, no appetite, weight loss and deconditioning. Son Leilani Deeds with him with several questions and concerns today. Particularly worried about his fathers deteriorating energy. We reviewed scans from 1/8/18. He has extensive lung metastasis which however are stable on Votrient. I will have scans reviewed in Tumor board as well. Options moving forward were discussed such as decreasing the dose of Votrient further to 200 mg vs switching to palliative immunotherapy. Side effects were reviewed in detail in either case. Questions addressed. After our discussions decided to continue with Votrient at 200 mg for another month . If however he is tolerating this poorly- will communicate via [employer]out[employer] and we will switch to Nivolumab. Dicussed that drug approvals from insurance take time and once consented a change may not come into effect for another week. Hypertension  Secondary to Votrient. Grade 1 on 10 mg of Amlodipine    Pleuritic chest pain  Resolved    Cough  Secondary to hilar adenopathy  Improved on Guaifenesin + codeine syrup    Pneumatoceles  Noted on scans 1/8/18- likely a result of damage from known tumor emboli.  Discussed risk of pneumothorax    Pulmonary tumor emboli  Generally an ominous manifestation. Anticoagulation is not thought to be helpful but some of what we see in his scans may be thrombi. Continue Eliquis for now    Proteinuria  Love UPC ratio is 1 g- grade 1 proteinuria secondary to Votient  24 hour urine protein ordered  Expect that it might have improved on decreased dose and improved BP    CKD:   Fluctuating creatinine, mild hyperkalemia  Arrange for IVF in [facility] Monday. Anemia  New- no anemia, secondary to malignancy?   include B12, Iron studies, hemolysis panel with next labs    RTC 1 month    I spent > 40 minutes in direct face-to-face interaction, more than 50% of time spent in counseling and coordination of care    Ynes Jarrett MD    Mr. Kelly Stanton has a reminder for a \"due or due soon\" health maintenance. I have asked that he contact his primary care provider for follow-up on this health maintenance.

## 2018-01-16 NOTE — PROGRESS NOTES
Late entry:  On 1/12/17 had the opportunity to meet with patient, wife and stepson prior to their scheduled appointment with MD.  They continue to express frustration surrounding votrient receipt. I had previously explained how I felt the change in insurance and change in specialty delivery pharmacies occurred just at the first of the. They feel someone \"dropped the ball\". Pt changed insurance 1/1 which resulted in change of specialty pharmacy/[facility] transferred pharmacy to [facility]. Follow up and urgent request sent although patient still had a delay in treatment. Contact numbers provided for [facility], encourage patient to follow through with [facility] 5-10 prior to needing medication. Assured patient and family I would also watch carefully as prescriptions and changes needed. Pt Currently on 200 mg daily votirent. Thanked for time in explaining processing of medication  Encouraged patient to call anytime as questions arise.

## 2018-01-17 NOTE — PROGRESS NOTES
Discussed scans with Radiology    Overall there is suggestion that votrient is working    Will continue at current dose     Please let patient know

## 2018-01-18 NOTE — TELEPHONE ENCOUNTER
Call from patient. HIPAA verified. He is picking up his 24 hour urine container today. Reminded urine collection needs to start with first urination in the morning. Hydration to be scheduled next week at patient preference as he does not want to do today. Hydration orders to be sent to [facility] once signature obtained. Advised per Jeancarlos note his votrient appears to be working.

## 2018-01-25 NOTE — TELEPHONE ENCOUNTER
Spoke with patient, yesterday pain in right ankle, 9/10 pain,  couldn't bare weight, a little better today, 7/10 pain, no redness or swelling.   Scheduled to be seen

## 2018-01-25 NOTE — TELEPHONE ENCOUNTER
Would like to speak with Anne or Dr. Eduardo Tony. Having issues with his right foot. He thinks it might to gout.

## 2018-01-26 NOTE — PROGRESS NOTES
Chief Complaint   Patient presents with    Ankle Pain     right         HPI:       is a 68 y.o. male. Renal cell carcinoma. Known to have tumor emboli in the lungs. Anticoagulated with Apixaban. Pulmonary emboli-anticoagulated. Now has a painful right ankle. Has never had gout. No Known Allergies    Current Outpatient Prescriptions   Medication Sig    PAZOPanib (VOTRIENT) 200 mg tablet Take two tabs by mouth daily  Indications: renal cell carcinoma    amLODIPine (NORVASC) 5 mg tablet Take 2 Tabs by mouth daily.  Codeine-guaiFENesin 6.3-100 mg/5 mL liqd Take 5 mL by mouth nightly as needed. Max Daily Amount: 5 mL. Indications: Cough    apixaban (ELIQUIS) 5 mg tablet Take 1 Tab by mouth two (2) times a day. Indications: possible lung blood clot    ANTIOX#10/OM3/DHA/EPA/LUT/ZEAX (I-CAPS PO) Take  by mouth. No current facility-administered medications for this visit. Facility-Administered Medications Ordered in Other Visits   Medication    0.9% sodium chloride infusion       Past Medical History:   Diagnosis Date    Actinic keratosis     Calculus of kidney     Chronic pain     Affecting lower back,Grade 3L5/S1 spondylisthesis    Hypercholesterolemia     Palpitations     PE (pulmonary embolism) 09/01/2014    Post right nephrectomy    Renal carcinoma ([facility] [address]     Dx'd inSept.2014, S/P surgical resection         ROS:  Denies fever, chills, cough, chest pain, SOB,  nausea, vomiting, or diarrhea. Denies wt loss, wt gain, hemoptysis, hematochezia or melena.     Physical Examination:    /66 (BP 1 Location: Left arm, BP Patient Position: Sitting)  Pulse 88  Resp 16  SpO2 96%    General: Alert and Ox3, Fluent speech  HEENT:  NC/AT, EOMI, OP: clear  Neck:  Supple, no adenopathy, JVD, mass or bruit  Chest:  Clear to Ausculation, without wheezes, rales, rubs or ronchi  Cardiac: RRR  Abdomen:  +BS, soft, nontender without palpable HSM  Extremities:  No cyanosis, clubbing or edema; right ankle is red  Neurologic:  Ambulatory without assist, CN 2-12 grossly intact. Moves all extremities. Skin: no rash  Lymphadenopathy: no cervical or supraclavicular nodes      ASSESSMENT AND PLAN:     1. Right ankle pain:  DDx:  Gout, infection, inflammatory arthropathy, tumor and others. Will need empiric keflex and steroids, labs and x ray. Arrange follow up next week. May need MRI if not better. No orders of the defined types were placed in this encounter.       Naseem Felix MD, [address]

## 2018-01-26 NOTE — MR AVS SNAPSHOT
Vinay Yoel 
 
 
 1000 [facility] 2[address] [facility],5Th Floor [address] [phone] Patient: Allen Ivan. MRN: [id number] MDC:9/5[birth year] Visit Information Date & Time Provider Department Dept. Phone Encounter #  
 1/26/2018 11:15 AM Isidro Bustos MD [address] [id number] Follow-up Instructions Return in about 1 week (around 2/2/2018). Follow-up and Disposition History Your Appointments 2/13/2018  1:30 PM  
Follow Up with Roise Kayser, MD  
[facility]ER[facility] Oncology at [facility]) Appt Note: 1mo fu  
 [address] [facility] [address]  
[phone]  
  
   
 [address] [address] [facility] [address]  
  
    
 2/15/2018 11:00 AM  
ESTABLISHED PATIENT with Isidro Bustos MD  
[address] ([facility]) Appt Note: 2 MO F/U  
 [address] [facility],5Th Floor [address] [phone]  
  
   
 [address] [address] Upcoming Health Maintenance Date Due  
 MEDICARE YEARLY EXAM 6/30/2018 GLAUCOMA SCREENING Q2Y 6/2/2019 DTaP/Tdap/Td series (2 - Td) 10/7/2025 Allergies as of 1/26/2018  Review Complete On: 1/26/2018 By: Isidro Bustos MD  
 No Known Allergies Current Immunizations  Reviewed on 1/23/2018 Name Date Influenza High Dose Vaccine PF 9/1/2017  1:37 PM  
 Influenza Vaccine 11/4/2016, 10/7/2015 12:17 PM  
 Pneumococcal Conjugate (PCV-13) 6/29/2017  4:05 PM  
 Pneumococcal Polysaccharide (PPSV-23) 11/23/2010, 10/28/2005 Tdap 10/7/2015 12:19 PM  
 Zoster Vaccine, Live 4/4/2011 Not reviewed this visit You Were Diagnosed With   
  
 Codes Comments Acute right ankle pain    -  Primary ICD-10-CM: M25.571 ICD-9-CM: 719.47, 338.19 Vitals BP Pulse Resp SpO2 Smoking Status 110/66 (BP 1 Location: Left arm, BP Patient Position: Sitting) 88 16 96% Former Smoker Preferred Pharmacy Pharmacy Name Phone [address] [place] [facility] [phone] Your Updated Medication List  
  
   
This list is accurate as of: 1/26/18  1:01 PM.  Always use your most recent med list. amLODIPine 5 mg tablet Commonly known as:  Ellen Prather Take 2 Tabs by mouth daily. apixaban 5 mg tablet Commonly known as:  Juliet Thompson Take 1 Tab by mouth two (2) times a day. Indications: possible lung blood clot  
  
 cephALEXin 500 mg capsule Commonly known as:  Mackenzie Mcnulty Take 1 Cap by mouth four (4) times daily for 10 days. Codeine-guaiFENesin 6.3-100 mg/5 mL Liqd Take 5 mL by mouth nightly as needed. Max Daily Amount: 5 mL. Indications: Cough I-CAPS PO Take  by mouth.  
  
 methylPREDNISolone 4 mg tablet Commonly known as:  Abigailromero Saad Take as directed PAZOPanib 200 mg tablet Commonly known as:  Misaels Leonidas Take two tabs by mouth daily  Indications: renal cell carcinoma Prescriptions Sent to Pharmacy Refills  
 methylPREDNISolone (MEDROL DOSEPACK) 4 mg tablet 0 Sig: Take as directed Class: Normal  
 Pharmacy: [address] [place] [address] Ph #: [phone]  
 cephALEXin (KEFLEX) 500 mg capsule 0 Sig: Take 1 Cap by mouth four (4) times daily for 10 days. Class: Normal  
 Pharmacy: [address] [place] [address] Ph #: [phone] Route: Oral  
  
We Performed the Following CBC WITH AUTOMATED DIFF [48690 CPT(R)] METABOLIC PANEL, COMPREHENSIVE [01900 CPT(R)] SED RATE (ESR) [id number] CPT(R)] URIC ACID [id number] CPT(R)] Follow-up Instructions Return in about 1 week (around 2/2/2018). To-Do List   
 Around 01/31/2018 Imaging:  XR ANKLE RT MIN 3 V Introducing \[facility]\"" & HEALTH SERVICES! Dear Leena Reading: Thank you for requesting a [employer] account.   Our records indicate that you already have an active [facility] account. You can access your account anytime at [email or web address] Did you know that you can access your hospital and ER discharge instructions at any time in [facility]? You can also review all of your test results from your hospital stay or ER visit. Additional Information If you have questions, please visit the Frequently Asked Questions section of the [facility] website at [email or web address] Remember, [facility] is NOT to be used for urgent needs. For medical emergencies, dial 911. Now available from your iPhone and Android! Please provide this summary of care documentation to your next provider. Your primary care clinician is listed as Marnie Jim. If you have any questions after today's visit, please call [phone].

## 2018-01-30 NOTE — TELEPHONE ENCOUNTER
Pt appointment has been changed to 2/16/2018 at 9:30. Pt wants to know will he need labs prior to appoinment.   Would like a call back

## 2018-01-30 NOTE — TELEPHONE ENCOUNTER
Returned call HIPAA verified. Advised of recent lab values as indicated. Standing lab orders mailed to patient and faxed to [facility] lab at patient request (confirmatio rec'd). He plans to go weekly on Thursdays. He continues to have the persistent slight pain in chest with an occasional oxycodone in use with success. Encouraged to call with any ongoing questions/concerns  Aware of scheduled clinic appointment 2/16/18.

## 2018-02-01 NOTE — PROGRESS NOTES
Votrient refill request for 200 mg daily process to [facility]. Confirmation rec'd. Front to scan.

## 2018-02-05 NOTE — TELEPHONE ENCOUNTER
HIPAA verified. Stated recd meds from PCP and thanked for call. Would like to change appointment 2/16/18 to later in the day and has questions about standing labs at [facility]. [facility] for [facility] of available appointments and return call.

## 2018-02-05 NOTE — TELEPHONE ENCOUNTER
Detailed message left on home VM to return call with symptom update 2/6/18. Per Dr. Krysten Granger may need CT scan. See note.

## 2018-02-05 NOTE — TELEPHONE ENCOUNTER
Hx lung cancer/votrient/hx PEs. HIPAA verified. Stated right sided chest pain worse with deep breathing. Pain worse with cough. Stated pain ongoing over past 10 days, but worse since Saturday. Stated ran out of pain medication and rates pain sharp 6/10 at rest.  Would like pain and cough med refilled. Advised narcotics would need paper script. Wife Boniat Rodriguez stated may reach out to PCP for assist due to distance from [facility]. Wants all testing done at [facility]. Reviewed sx of heart attack to seek urgent care. Patient and wife verbalized understanding. To provider for plan.

## 2018-02-06 NOTE — TELEPHONE ENCOUNTER
Call received from patient, HIPAA verified. Patient states that he continues to have right sided chest pain, worse with deep breaths and coughing. Spoke with provider, advised of need for CT at this time, order for CT has been placed. Will assist with scheduling. Patient inquiring about [facility] standing orders for [facility] for this week and next, advised would follow up and return call in am. Thanked for assistance.

## 2018-02-07 NOTE — TELEPHONE ENCOUNTER
Call to patient, HIPAA verified. Patient is aware of CT scheduled on 2/8/18, was contacted by . Updated that lab orders have been sent to [facility] as requested, patient needs to have done weekly. Patient verbalized understanding and thanked for information.

## 2018-02-07 NOTE — TELEPHONE ENCOUNTER
Patient had to cancel his appt on feb. 15th. Wants to reschedule with Dr. Sushant Rosas in [place] after that date. Told patient we would have Anne check schedule tomorrow and call him back.

## 2018-02-08 PROBLEM — J93.9 PNEUMOTHORAX ON RIGHT: Status: ACTIVE | Noted: 2018-01-01

## 2018-02-09 PROBLEM — J93.9 PNEUMOTHORAX, RIGHT: Status: ACTIVE | Noted: 2018-01-01

## 2018-02-12 NOTE — TELEPHONE ENCOUNTER
Confirmation from file room that image has been uploaded to system for [facility], they just have to download to be able to view. Call to [facility], updated on information.

## 2018-02-12 NOTE — TELEPHONE ENCOUNTER
Patient's wife, Leena Morillo, called on his behalf requesting a return call. Patient was transferred to [facility] and she wants to verify our office has sent all information relating to his admission there. Please return call to discuss [phone].   beryl

## 2018-02-12 NOTE — TELEPHONE ENCOUNTER
Call returned to patiens wife, Maddy Lopez. HIPAA verified. Patient has been admitted to [address] [place] [address], under the care of Dr. Bela Freire. They are requesting images of CTA done 2/8/18 as they are unable to view. Spoke with nurse on unit Ted WILSON. Report faxed to unit as requested. Request sent to file room to load images, they are to update our office if they do not receive or are unable to view. Thanked for assistance.

## 2018-02-16 NOTE — TELEPHONE ENCOUNTER
Patient's wife, called on his behalf requesting a return call from Dr. Gisele Magaña. She would like to discuss the patient's chemo medication. Patient was told at [address] [facility] [address] Gisele Magaña wants him to stop taking it. Wife wants to speak with Dr. Gisele Magaña only. Please return call to discuss [phone].   [facility]

## 2018-02-16 NOTE — TELEPHONE ENCOUNTER
Received call from [address] [place] [address] verified    Pt currently admitted to [facility]. He was questioning votrient dosage. After discussing with Dr Patrice Gamboa. We will hold votrient at this time until he is seen in our office for follow up. Jarred to advise attending MD at [facility].

## 2018-02-22 NOTE — TELEPHONE ENCOUNTER
Needs to speak with Verito Inman. His 2 sons are in town and he wants to bring them by to meet Dr. Shmuel Hayden. Does not want a visit.

## 2018-02-22 NOTE — PROGRESS NOTES
ONCOLOGY NURSE NAVIGATOR    Supportive visit with Mr. Kelly Stanton, his brother, Kaycee Nava, and Elmer 2 sons, Vivian York and Boston Saldivar -- following OV with Dr. Katiuska Bragg    Since I was last in touch with Everardo Montoya, he's been receiving 200 mg daily votrient. Dosage has been reduced multiple times as he's poorly tolerated higher doses. His last CT in early January 2018 showed stable to improved disease but there's been overall trend in his declining health. Everardo Montoya was hospitalized at [address] [place] [address] for right pneumothorax on 2/8. He is s/p talc pleurodesis per Dr. Elke Pretty at [address] [place] [address] and was discharged yesterday. He spent the night last night at brothjordi Madera's home in [address] [place] [address]. Today Everardo Montoya presents for follow up with Dr. Katiuska Bragg before going home to [address] [place] [address] (83 miles from [address] [place] [address]). He tells me he is continuing to take votrient. Everardo Montoya is tired and irritable by his own admission. Hannah Shearing he got little rest in hospital and we acknowledged that time in hospital can be deconditioning. Theopolis Sheer to get home \"to rest.\"  Family cites concerns about meeting his care needs at home and are interested in getting DME and supports in the home. Everardo Montoya tells me \"I don't need that and when they offered it at [address] [place] [address], I refused it. \"    Today he reluctantly acquiesces to family's concerns. Wife, Nikhil Echevarria, is at home \"preparing\" for Elmer arrival.  Gina Panaca, Vivian York, will stay with mom and dad \"for at least a month\" to help with his care. At present, he has home O2 but sons note that dad is not using it much. They have a pulse oximeter that is not reliable and question when to know to use it as well as when to use the O2. He's mostly using cumbersome portable tanks and they seek advice about tanks that are easier to transport and how to set up O2 condenser in the home to [facility]. Everardo Montoya says he's up to BR with diarrhea every 2-3 hours every night. Very fatiguing. Family identifies that Everardo Montoya has a walker at home.   They believe he could benefit from a shower chair, wheelchair and perhaps a BSC. Pam Krishnamurthy reluctantly agrees to this DME to be used as needed. Family is also interested in learning about hospice. Pam Krishnamurthy declined this for now but all understand that a hospice informational meeting can be scheduled at home at anytime. Alejandra Nupur recalls from a EOL care experience that learning about hospice sooner rather than later was helpful for family caregivers. We agreed on the followin. Home health referral to assess for DME and home safety.     -  Nursing and SW can visit to assess and can facilitate ordering of DME.     -  Home PT will be helpful and can assist with using home O2 effectively. -- Will order [facility] ASAP but reality is Pam Krishnamurthy may have several days at home before [facility] intake visit occurs. We discussed using this time for him to eval what is going well and what he  could use help with.     -  If he needs help before [facility] can see him, he's directed to call ONN or Dr. Mamadou Gonzalez office to discuss. 2.  Home O2:   -  Family to contact home health O2 company to discuss delivery devices that can support best in and out of home. We discussed types of portable tanks available. -  Discussed use of pulse ox as an assessment aide but how Pam Krishnamurthy is feeling  -- e.g., Is he SOB? Does O2 help him move with less effort?  -- should direct use of O2.     -  Home PT will be helpful and can assist with using home O2 effectively. 3.  Pam Krishnamurthy will RTC 1 month. Labs every other week. Family wants to anticipate O2 and DMT supports needed to get care outside of home nearby at [facility] or in [address] [place] [address] Drive at [facility]. 4.  Home hospice consult. -  Discussed benefits of care that comes to you focusing on QOL and comfort rather than treatment. -  Discussed evidence that hospice can extend life with better QOL. Abril Jaycob will consider -- he would like to get home right now and maybe see hospice for info in a week or two.   Hospice informational meeting can be scheduled at home at anytime. 5.  Reviewed ONN role and how to contact me. Enc self care for caregivers. ONN Plan:      Rec continuing to be mindful of Adolfo's autonomy and dignity but educate and enc use of home supports available at a pace he can tolerate. I will check in on [address] [facility] [place] and family tomorrow and next week.         Jamel Quiroz MS RN AOCNS

## 2018-02-22 NOTE — TELEPHONE ENCOUNTER
Processed Home Health referral to [employer]. Confirmation rec'd. Call from [address]. They are 1.5 weeks behind for PT./OT assessments. Advised to keep on schedule. Will look into alternative service availability.

## 2018-02-22 NOTE — MR AVS SNAPSHOT
[address] [facility] [address] [place] [address] 
[phone] Patient: Alexandra Hernandez. MRN: [id number] QCO:8/5/9932 Visit Information Date & Time Provider Department Dept. Phone Encounter #  
 2/22/2018  8:00 AM Kai Liu MD [facility] Oncology at [address] [id number] Follow-up Instructions Return in about 4 weeks (around 3/22/2018). Routing History Your Appointments 3/28/2018  1:00 PM  
Follow Up with Kai Liu MD  
[facility] Oncology at [facility] Appt Note: 1 month f/u- Kidney CA  
 [address] [address]  
[phone]  
  
   
 [address] [facility] [address] Upcoming Health Maintenance Date Due  
 MEDICARE YEARLY EXAM 6/30/2018 GLAUCOMA SCREENING Q2Y 6/2/2019 DTaP/Tdap/Td series (2 - Td) 10/7/2025 Allergies as of 2/22/2018  Review Complete On: 2/22/2018 By: Kai Liu MD  
 No Known Allergies Current Immunizations  Reviewed on 1/23/2018 Name Date Influenza High Dose Vaccine PF 9/1/2017  1:37 PM  
 Influenza Vaccine 11/4/2016, 10/7/2015 12:17 PM  
 Pneumococcal Conjugate (PCV-13) 6/29/2017  4:05 PM  
 Pneumococcal Polysaccharide (PPSV-23) 11/23/2010, 10/28/2005 Tdap 10/7/2015 12:19 PM  
 Zoster Vaccine, Live 4/4/2011 Not reviewed this visit You Were Diagnosed With   
  
 Codes Comments Kidney cancer, primary, with metastasis from kidney to other site, unspecified laterality ([facility] [address]    -  Primary ICD-10-CM: C64.9, C79.9 ICD-9-CM: 189.0, 199.1 Carcinoma of kidney, unspecified laterality ([address]     ICD-10-CM: C64.9 ICD-9-CM: 189.0 Malignant neoplasm metastatic to lung, unspecified laterality ([facility] [address]     ICD-10-CM: C78.00 ICD-9-CM: 197.0  Bilateral pulmonary embolism (HCC)     ICD-10-CM: I26.99 
ICD-9-CM: 415.19   
 SOB (shortness of breath) on exertion     ICD-10-CM: R06.02 
 ICD-9-CM: 786.05 Vitals BP Pulse Temp Resp Height(growth percentile) Weight(growth percentile) 114/78 (!) 104 97.9 °F (36.6 °C) 20 5' 7\" (1.702 m) 113 lb (51.3 kg) SpO2 BMI Smoking Status 94% 17.7 kg/m2 Former Smoker Vitals History BMI and BSA Data Body Mass Index Body Surface Area 17.7 kg/m 2 1.56 m 2 Preferred Pharmacy Pharmacy Name Phone [address] [place] [phone] Your Updated Medication List  
  
   
This list is accurate as of 2/22/18  9:30 AM.  Always use your most recent med list. amLODIPine 5 mg tablet Commonly known as:  Kebede Carlos Take 2 Tabs by mouth daily. apixaban 5 mg tablet Commonly known as:  Napier Wilfrid Take 1 Tab by mouth two (2) times a day. Indications: possible lung blood clot Codeine-guaiFENesin 6.3-100 mg/5 mL Liqd Take 5 mL by mouth nightly as needed. Max Daily Amount: 5 mL. Indications: Cough COLACE 100 mg capsule Generic drug:  docusate sodium Take 100 mg by mouth two (2) times a day. HYDROcodone-acetaminophen 5-325 mg per tablet Commonly known as:  Adolm Davis Take 1 Tab by mouth two (2) times daily as needed for Pain. Max Daily Amount: 2 Tabs. I-CAPS PO Take  by mouth.  
  
 methylPREDNISolone 4 mg tablet Commonly known as:  MEDROL DOSEPACK  
  
 ondansetron 4 mg disintegrating tablet Commonly known as:  ZOFRAN ODT Take 1 Tab by mouth every eight (8) hours as needed for Nausea. oxyCODONE IR 5 mg immediate release tablet Commonly known as:  ROXICODONE  
  
 PAZOPanib 200 mg tablet Commonly known as:  Hardik Pa Take two tabs by mouth daily  Indications: renal cell carcinoma Prescriptions Sent to Pharmacy Refills  
 ondansetron (ZOFRAN ODT) 4 mg disintegrating tablet 2 Sig: Take 1 Tab by mouth every eight (8) hours as needed for Nausea.   
 Class: Normal  
 Pharmacy: [address] [facility], [address]  #: [phone] Route: Oral  
  
Follow-up Instructions Return in about 4 weeks (around 3/22/2018). Introducing [facility] SERVICES! Dear Birdie Ji: Thank you for requesting a [facility] account. Our records indicate that you already have an active [facility]Proof account. You can access your account anytime at [email or web address] Did you know that you can access your hospital and ER discharge instructions at any time in [facility]? You can also review all of your test results from your hospital stay or ER visit. Additional Information If you have questions, please visit the Frequently Asked Questions section of the PadProof website at [email or web address] Remember, [facility] is NOT to be used for urgent needs. For medical emergencies, dial 911. Now available from your iPhone and Android! Please provide this summary of care documentation to your next provider. Your primary care clinician is listed as Indigo Amin. If you have any questions after today's visit, please call [phone].

## 2018-02-22 NOTE — PROGRESS NOTES
Hematology/Oncology follow-up    REASON FOR visit: Stage IV Renal cell carcinoma    HISTORY OF PRESENT ILLNESS: Mr. Sita Gurrola is a 66 y.o. male with hyperlipidemia, hypertension, history of renal stones and actinic keratosis with stage IV renal cell carcinoma. Today he presents for follow-up after starting 800 mg of Votrient on 10/18/17. He was unable to tolerate the dose and hence decreased to 600 mg. He had grade 2 -3 elevation of LFTs and decreased to 400 mg subsequently. Continued to have failure to thrive and dose reduced further to 200 mg daily on 1/17/18. CT 1/8/18 was stable- to improved. He was then admitted with CP and SOB on 2/8/18 when he was found to have a R sided pneumothorax. He is now s/p talc pleurodesiis per Dr. Joby Parnell at [facility] and presents for follow up. He says that he has extreme SOB and he feels awful. He does not note any improvement in his SOB. He has been on O2 with activity. He has chest pain with cough. He had a traumatic time in the hospital- he states that he now he has not been taking any pain medication. Appetite is stable. He has been having some loose stools. SBP is much well controlled. No edema. He is always cold. He has had some unsteadiness in gait. He continues to loose weight. Oncologic history reviewed and summarized below  Initially presented with hematuria in August 2014 at which point a CT of his abdomen and pelvis showed a 4.3 cm right upper kidney mass with IVC and right renal vein extension. Underwent a right radical nephrectomy 9/10/14 -there was concern for intraoperative, tumor spillage. Pathology showed an 8 cm clear cell carcinoma invading the hilum, focally invading the renal vein and the inferior vena cava, grade 3, margins negative, lymphatic invasion was present, one positive lymph node found (pT3N1). He developed pulmonary embolism postoperatively. He was placed on Coumadin for 2 years.  Had been on surveillance with negative scans until he presented in May 2017 with shortness of breath. CT angiogram at that time showed extensive bilateral pulmonary emboli with right upper lobe nodular opacities. He was started on apixaban but the shortness of breath persisted and  repeat CT angiogram on 9/6/17 showed extensive pulmonary emboli, without any improvement concerning for tumor emboli. A PET scan was obtained on 9/15/17 which demonstrated increased metabolic activity in bilateral metastatic pulmonary emboli. A 1 cm nodule in the right lower lobe was seen without any uptake. MRI brain was unremarkable. CBC was unremarkable, creatinine 1.2,LDH elevated at 278. Obtained a second opinion at [facility] with Dr. Brannon Faria. PET scans were reviewed again. Not found to be a candidate for clinical trials. Agreed with proceeding with Votrient    Treatment  10/18/17 Votrient 800 mg daily: 11/3/17: CTA obtained for chest pain showed continued disease progression. Dose decreased to 400 mg due to grade 2 LFT elevation. Dose reduced 200 mg on 1/17/18 due to failure to thrive. Past Medical History:   Diagnosis Date    Actinic keratosis     Calculus of kidney     Chronic pain     Affecting lower back,Grade 3L5/S1 spondylisthesis    Hypercholesterolemia     Palpitations     PE (pulmonary embolism) 09/01/2014    Post right nephrectomy    Renal carcinoma ([facility] [address]     Dx'd inSept.2014, S/P surgical resection; right nephrectomy       Past Surgical History:   Procedure Laterality Date    HX APPENDECTOMY      HX RENAL BIOPSY         No Known Allergies    Current Outpatient Prescriptions   Medication Sig Dispense Refill    Codeine-guaiFENesin 6.3-100 mg/5 mL liqd Take 5 mL by mouth nightly as needed. Max Daily Amount: 5 mL. Indications: Cough 1 Bottle 0    HYDROcodone-acetaminophen (NORCO) 5-325 mg per tablet Take 1 Tab by mouth two (2) times daily as needed for Pain. Max Daily Amount: 2 Tabs.  60 Tab 0    PAZOPanib (VOTRIENT) 200 mg tablet Take two tabs by mouth daily Indications: renal cell carcinoma 90 Tab 0    amLODIPine (NORVASC) 5 mg tablet Take 2 Tabs by mouth daily. 60 Tab 5    apixaban (ELIQUIS) 5 mg tablet Take 1 Tab by mouth two (2) times a day. Indications: possible lung blood clot 180 Tab 4    ANTIOX#10/OM3/DHA/EPA/LUT/ZEAX (I-CAPS PO) Take  by mouth. Social History     Social History    Marital status:      Spouse name: N/A    Number of children: N/A    Years of education: N/A     Social History Main Topics    Smoking status: Former Smoker    Smokeless tobacco: Never Used      Comment: 1 cigar a day    Alcohol use Yes      Comment: occassionally    Drug use: No    Sexual activity: Not on file     Other Topics Concern     Service No    Blood Transfusions No    Caffeine Concern No    Occupational Exposure No    Hobby Hazards No    Sleep Concern No    Stress Concern No    Weight Concern No    Special Diet No    Back Care Yes    Exercise Yes     normal daily activity     Bike Helmet Yes    Seat Belt Yes    Self-Exams Yes     Social History Narrative       Family History   Problem Relation Age of Onset    No Known Problems Mother     Cancer Brother      facial melanoma 2016       ROS  A  review of systems was obtained and is negative except as listed in HPI.   ECOG PS is 2  Emotional well being addressed and patient is coping well    Physical Examination:   Visit Vitals    /78    Pulse (!) 104    Temp 97.9 °F (36.6 °C)    Resp 20    Ht 5' 7\" (1.702 m)    Wt 113 lb (51.3 kg)    SpO2 94%    BMI 17.7 kg/m2     General appearance - alert, tired appearing, SOB  Mental status - oriented to person, place, and time  Mouth - mucous membranes moist, pharynx normal without lesions  Neck - supple, no significant adenopathy  Lymphatics - no palpable lymphadenopathy, no hepatosplenomegaly  Chest - clear to auscultation, no wheezes, rales or rhonchi, symmetric air entry  Heart - normal rate, regular rhythm, normal S1, S2, no murmurs, rubs, clicks or gallops  Musculoskeletal - no joint tenderness, deformity or swelling  Extremities - peripheral pulses normal, no pedal edema, no clubbing or cyanosis  Skin - normal coloration and turgor, no rashes, no suspicious skin lesions noted    LABS  Lab Results   Component Value Date/Time    WBC 13.1 (H) 02/09/2018 04:45 PM    HGB 11.4 (L) 02/09/2018 04:45 PM    HCT 35.4 (L) 02/09/2018 04:45 PM    PLATELET 191 31/55/3220 04:45 PM    .3 (H) 02/09/2018 04:45 PM    ABS. NEUTROPHILS 7.9 02/08/2018 02:07 PM     Lab Results   Component Value Date/Time    Sodium 130 (L) 02/08/2018 02:07 PM    Potassium 4.7 02/08/2018 02:07 PM    Chloride 95 (L) 02/08/2018 02:07 PM    CO2 23 02/08/2018 02:07 PM    Glucose 119 (H) 02/08/2018 02:07 PM    BUN 24 (H) 02/08/2018 02:07 PM    Creatinine 1.11 02/08/2018 02:07 PM    GFR est AA >60 02/08/2018 02:07 PM    GFR est non-AA >60 02/08/2018 02:07 PM    Calcium 8.4 (L) 02/08/2018 02:07 PM     Lab Results   Component Value Date/Time    AST (SGOT) 28 02/08/2018 02:07 PM    Alk. phosphatase 147 (H) 02/08/2018 02:07 PM    Protein, total 7.0 02/08/2018 02:07 PM    Albumin 2.1 (L) 02/08/2018 02:07 PM    Globulin 4.9 (H) 02/08/2018 02:07 PM    A-G Ratio 0.4 (L) 02/08/2018 02:07 PM     PATHOLOGY  Reviewed under oncologic history    RADIOLOGY  Reviewed notes from [facility]. PET scan interpretation at [facility] reviewed as well  Second opinion interpretation of outside study dated 9/6/2017. 1.  Large pulmonary emboli bilaterally including the right lobar artery with no evidence of right ventricular strain. Given the PET/CT findings 9/15/2017, this is consistent with tumor emboli. 2.  2.4 cm mass contiguous with the right middle lobe pulmonary artery may BE a large embolus or mass. 3.  Right hilar pulmonary nodule measuring 1.8 cm most likely metastatic disease.   4.  Status post right nephroureterectomy with no evidence of local disease recurrence in the surgical bed.  5.  Nonobstructive 5 mm left renal calculus appears to be within the inferior left renal pelvis. 11/3/17: CTA chest  IMPRESSION  IMPRESSION:   1. Extensive bilateral pulmonary emboli have not changed significantly compared  to the 9/6/2017 examination. Again, tumor emboli must be considered. 2. Enlarging bilateral pulmonary nodules compatible with metastatic disease. 3. New airspace disease right lower lobe may be related to acute infiltrate  versus pulmonary infarct.     1/8/18  IMPRESSION  IMPRESSION:     New right lung air cavities which may represent pneumatoceles. Extensive  bilateral pulmonary filling defects are unchanged and may indicate extensive  tumor emboli. Pulmonary metastases again noted. Other findings described above  have not changed significantly compared to prior CT exams.       CT 2/8/18  IMPRESSION  IMPRESSION:     1. New right-sided pneumothorax measuring approximately 50% of thoracic volume. 2. Unchanged extensive bilateral pulmonary emboli, presumed to be tumor emboli. 3. Thick-walled air collections within the right lung. 4. Enlarging right suprahilar region mass. 5. Left hilar lymphadenopathy. 6. Markedly distended stomach containing fluid. 7. Small right-sided pleural effusion and right basilar atelectasis. ASSESSMENT  Mr. Kamilla Lynch is a 66 y.o. 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 PLAN    Stage IV renal cell carcinoma T3 N1 M1-   See several past discussions pertaining to management    He has tolerated Votrient poorly with several interruptions this month due to a pneumothorax. He is now on 200 mg of Votrient. CT 1/2018 with some response, CT 2/8/18 stable (noted a 0.3 cm increase in the R hilar mass). He has a poor prognosis with a rather aggressive an unusual disease presentation with extensive pulmonary tumor emboli. His main pulmonary trunk is almost completely filled with tumor emboli.  Understands that Votrient may cause both bleeding and clotting which could be fatal. However so far lung disease has been stable on Votrient hence holding off on switching to Nivolumab. The latter to be considered if he progresses or is intolerant to current dose of Votrient. A clinical response is unlikely in either case and immune mediated pneumonitis may again be a potentially fatal complication    We agreed on resuming Votrient at 200 mg daily  He will have labs every 2 weeks. I plan to repeat scans mid April      Hypertension  Secondary to Votrient. Grade 1 on 10 mg of Amlodipine    Pneumothorax  S/P Talc pleurodesis    He has several areas of devitalized lung and resulting pneumatoceles which led to the pneumothorax  This could potentially recur  The process unfortunately is irreversible due to tumor emboli      Cough  Secondary to lung disease  Improved on Guaifenesin + codeine syrup    Pulmonary tumor emboli  Generally an ominous manifestation. Anticoagulation is not thought to be helpful but some of what we see in his scans may be thrombi. Continue Eliquis for now    Proteinuria  [place] UPC ratio is 1 g- grade 1 proteinuria secondary to Votient  Will repeat in 2 months    CKD:   Resolved    Anemia  New- no anemia, secondary to malignancy? Improved    Goals of care  Talked a lot about the overall decline in health, aggressive malignancy. There is a risk of sudden death. We discussed Hospice. Though they do not want to enroll on it it yet, would like some information on Hospice. NN present    RTC 1 month. Labs every other week  Obtain imaged from CT 2/8/18    I spent > 40 minutes in direct face-to-face interaction, more than 50% of time spent in counseling and coordination of care    Roise Kayser, MD    Mr. Jose Toussaint has a reminder for a \"due or due soon\" health maintenance. I have asked that he contact his primary care provider for follow-up on this health maintenance.

## 2018-02-23 NOTE — TELEPHONE ENCOUNTER
Researched alternative, timely and appropriate home health as indicated in referral for patient. It is a limited service area. Will proceed with [facility]. Call to [place] [phone].   Advised to keep on schedule and proceed with referral

## 2018-02-28 PROBLEM — J96.90 RESPIRATORY FAILURE (HCC): Status: ACTIVE | Noted: 2018-01-01

## 2018-02-28 PROBLEM — J96.21 ACUTE ON CHRONIC RESPIRATORY FAILURE WITH HYPOXIA (HCC): Status: ACTIVE | Noted: 2018-01-01

## 2018-02-28 PROBLEM — Z66 DNR (DO NOT RESUSCITATE): Status: ACTIVE | Noted: 2018-01-01

## 2018-02-28 NOTE — PROGRESS NOTES
Pt called has had a history of a collapse lung. Pt was advised by his lung doctor to go to the ER or call 911. [facility] Pt called to our office wanted to know if they should do this. Advised them yes.

## 2018-03-01 PROBLEM — J18.9 HCAP (HEALTHCARE-ASSOCIATED PNEUMONIA): Status: ACTIVE | Noted: 2018-01-01

## 2021-03-02 NOTE — TELEPHONE ENCOUNTER
Notified patient of result of D-Dimer, scheduled for CTA chest abd pelvis, to go to the hosp now to be worked in. Also to start Eliquis BID, samples were given to start.
none

## 2023-04-27 NOTE — PROGRESS NOTES
Please have him set up for 1 L IV fluid 1/12/17    He would need to have a 24 hour urine done for protein tomorrow    Domingo Reynoso and Travis Givens- Please address Votient delivery and answer patients concerns about timely delivery of Votrient prior to my visit with him tomorrow    Thanks Ketoconazole Pregnancy And Lactation Text: This medication is Pregnancy Category C and it isn't know if it is safe during pregnancy. It is also excreted in breast milk and breast feeding isn't recommended.

## 2023-11-06 NOTE — TELEPHONE ENCOUNTER
HIPAA verified. Reviewed upcoming appointments. [facility] lab ant[facility] this week for labs. Jan 4  [place] [id number] hydration [facility]    Reviewed locations and times   Agreeable to plan. Quality 130: Documentation Of Current Medications In The Medical Record: Current Medications Documented Quality 111:Pneumonia Vaccination Status For Older Adults: Patient received any pneumococcal conjugate or polysaccharide vaccine on or after their 60th birthday and before the end of the measurement period Quality 226: Preventive Care And Screening: Tobacco Use: Screening And Cessation Intervention: Patient screened for tobacco use and is an ex/non-smoker Quality 110: Preventive Care And Screening: Influenza Immunization: Influenza Immunization previously received during influenza season Detail Level: Detailed Quality 431: Preventive Care And Screening: Unhealthy Alcohol Use - Screening: Patient not identified as an unhealthy alcohol user when screened for unhealthy alcohol use using a systematic screening method Quality 47: Advance Care Plan: Advance Care Planning discussed and documented; advance care plan or surrogate decision maker documented in the medical record.

## 2024-11-05 NOTE — PROGRESS NOTES
[facility]  HOSPITAL MEDICINE PROGRESS NOTE    Patient: Tata Parks Today's Date: 11/5/2024   YOB: 1954 Admission Date: 11/5/2024  2:46 PM   MRN: [id number] Inpatient LOS: 0 day(s)   Room:  [address] Hospital Day:  Hospital Day: 1       History and Subjective complaints     Chief Complaint: nephrolithiasis    Interval history and Overnight events:  pt says she does get occ kidney pains in her back; no cp or sob; had some subjective fever a couple weeks ago and was on Abx a couple weeks ago.         Hospital Course  Patients interval history reviewed/EHR notes reviewed.   Tata Parks is a 70 year old female who presented on 11/5/2024 with complaints of No chief complaint on file.         Reviewed Pertinent Histories: Medical History, Surgical History, Social History, Family History,     ROS: 12-point review of systems negative except as above.    Medications: Reviewed     Scheduled Medications:    sodium chloride, 2 mL, 2 times per day  GENTAMICIN - PHARMACIST MONITORED, , See Admin Instructions  fluconazole (DIFLUCAN) IVPB, 200 mg, Daily      Continuous Infusions:    Current Facility-Administered Medications   Medication Dose Route Frequency Provider Last Rate Last Admin     PRN Medications:    Current Facility-Administered Medications   Medication Dose Route Frequency Provider Last Rate Last Admin    sodium chloride 0.9 % injection 10 mL  10 mL Intravenous PRN Dea Owens APNP             Physical Examination       Vital 24 Hour Range Most Recent Value   Temperature No data recorded     Pulse No data recorded     Respiratory No data recorded     Blood Pressure No data recorded     Pulse Oximetry No data recorded     Arterial BP No data recorded     O2 No data recorded       Intake and Output:  No intake or output data in the 24 hours ending 11/05/24 1613    Last Stool Occurrence:       Vital Most Recent Value First Value   Weight       Height       BMI   N/A  Patient notified by phone of lab results.     General: no apparent distress  CV: regular rate and rhythm  Resp: clear to auscultation bilaterally  Abd: nontender  Ext:  + edema Rt>Lt  Skin:  mild erythema to RLE; legs otherwise wrapped, not fully exmained  Neuro:  appears to move all 4 ext, uses wheelchair  Psych: oriented to time, place and person    Test Results     Labs: The Laboratory values listed below have been reviewed and pertinent findings discussed in the Assessment and Plan.    Laboratory values: No results found      No results found       Radiology: Imaging studies have been reviewed and pertinent findings discussed in the Assessment and Plan.    No results found for any visits on 11/05/24 (from the past 48 hour(s)).    Tubes, Devices, Monitoring               Assessment and Plan     Principal Problem:    Renal stones-plan for ureteroscopy and lithotripsy tomorrow;  following  Active Problems:    History of recurrent UTI (urinary tract infection)-would repeat UA and Cx; prev Cxs have been polymicrobial; defer ABx to , or can consider ID consult.    Chronic systolic heart failure  (CMD)-appears compensated    CKD (chronic kidney disease) stage 3, GFR 30-59 ml/min-would monitor    Self-care deficit    Retention of urine, unspecified-has [place]    Edema of both legs-uses tubigrips                Consults:    IP CONSULT TO HOSPITALIST  PHARMACY TO DOSE AND MONITOR GENTAMICIN  IP CONSULT TO SOCIAL WORK    Diet:     Therapy Orders:  No orders of the defined types were placed in this encounter.        There is no height or weight on file to calculate BMI.             Advanced Directives     Manas Webster MD  Hospitalist  11/5/2024  4:13 PM    (Contact by secure chat)